# Patient Record
Sex: FEMALE | Race: WHITE | NOT HISPANIC OR LATINO | Employment: FULL TIME | URBAN - METROPOLITAN AREA
[De-identification: names, ages, dates, MRNs, and addresses within clinical notes are randomized per-mention and may not be internally consistent; named-entity substitution may affect disease eponyms.]

---

## 2017-11-14 RX ORDER — ATORVASTATIN CALCIUM 20 MG/1
20 TABLET, FILM COATED ORAL DAILY
COMMUNITY
End: 2021-10-19

## 2017-11-14 RX ORDER — SUMATRIPTAN 100 MG/1
100 TABLET, FILM COATED ORAL ONCE AS NEEDED
COMMUNITY
End: 2021-10-19

## 2017-11-14 RX ORDER — MELOXICAM 15 MG/1
15 TABLET ORAL NIGHTLY
COMMUNITY
End: 2017-12-28 | Stop reason: HOSPADM

## 2017-11-14 RX ORDER — MULTIPLE VITAMINS W/ MINERALS TAB 9MG-400MCG
1 TAB ORAL DAILY
COMMUNITY
End: 2021-10-19

## 2017-11-14 RX ORDER — BACLOFEN 20 MG/1
20 TABLET ORAL 3 TIMES DAILY
COMMUNITY
End: 2023-02-12

## 2017-11-14 RX ORDER — LANOLIN ALCOHOL/MO/W.PET/CERES
1000 CREAM (GRAM) TOPICAL DAILY
COMMUNITY
End: 2021-10-19

## 2017-11-21 ENCOUNTER — HOSPITAL ENCOUNTER (OUTPATIENT)
Dept: GENERAL RADIOLOGY | Facility: HOSPITAL | Age: 52
Discharge: HOME | End: 2017-11-21
Attending: NEUROLOGICAL SURGERY | Admitting: NEUROLOGICAL SURGERY

## 2017-11-21 ENCOUNTER — OFFICE VISIT (OUTPATIENT)
Dept: NEUROSURGERY | Facility: CLINIC | Age: 52
End: 2017-11-21

## 2017-11-21 VITALS
HEIGHT: 67 IN | HEART RATE: 76 BPM | SYSTOLIC BLOOD PRESSURE: 108 MMHG | RESPIRATION RATE: 16 BRPM | WEIGHT: 145 LBS | DIASTOLIC BLOOD PRESSURE: 62 MMHG | BODY MASS INDEX: 22.76 KG/M2

## 2017-11-21 DIAGNOSIS — M54.12 CERVICAL RADICULOPATHY: ICD-10-CM

## 2017-11-21 DIAGNOSIS — M50.20 CERVICAL DISC HERNIATION: ICD-10-CM

## 2017-11-21 DIAGNOSIS — G90.513 COMPLEX REGIONAL PAIN SYNDROME AFFECTING BOTH UPPER ARMS: ICD-10-CM

## 2017-11-21 DIAGNOSIS — M54.12 CERVICAL RADICULOPATHY: Primary | ICD-10-CM

## 2017-11-21 DIAGNOSIS — G43.009 MIGRAINE WITHOUT AURA AND WITHOUT STATUS MIGRAINOSUS, NOT INTRACTABLE: ICD-10-CM

## 2017-11-21 PROCEDURE — 72052 X-RAY EXAM NECK SPINE 6/>VWS: CPT

## 2017-11-21 PROCEDURE — 99244 OFF/OP CNSLTJ NEW/EST MOD 40: CPT | Performed by: NEUROLOGICAL SURGERY

## 2017-11-21 RX ORDER — CLINDAMYCIN PHOSPHATE 900 MG/50ML
900 INJECTION INTRAVENOUS ONCE
Status: CANCELLED | OUTPATIENT
Start: 2017-12-27 | End: 2017-11-21

## 2017-11-21 RX ORDER — ONABOTULINUMTOXINA 100 [USP'U]/1
INJECTION, POWDER, LYOPHILIZED, FOR SOLUTION INTRADERMAL; INTRAMUSCULAR
COMMUNITY
Start: 2017-11-15 | End: 2021-10-19

## 2017-11-21 NOTE — PROGRESS NOTES
Subjective   Patient ID: Reba Banerjee is a 52 y.o. female is being seen for consultation today at the request of Katty Hairston, Amarifor neck, left arm pain, headaches, and back pain. She is accompanied by her .    History of Present Illness     52-year-old right-handed woman who presents on referral for consultation with regard to neck and left arm pain.  She notes that at the beginning of September she awakened with discomfort in the left neck and she thought that she simply had a neck spasm.  This however did not go away.  She has been having discomfort radiating into the lateral aspect of the left hand.  She has a severe sensation that shoots down the left arm.  The pain in the arm and sharp dull and aching.  She notes that the day after awakening that when she went to work the only way she was able to get any relief was to stand with her left arm extended above her head.  This also continues to give her some relief at times.    He is undergone physical therapy and medication management.    Her medical history is complicated by a history of complex regional pain syndrome type II.  This started with a ulnar transposition on the right side and spread to the left side as well and the upper neck.  She is also placed with migraine headaches.  Her complex regional pain syndrome began in 2007.  She has managed reasonably well with initial extensive management with pain management and now simple oral pain medication regimen.    She has noted associated symptoms with imbalance, centration issues, and a central spinal pain that radiates from the neck down to the medial aspect of the shoulder blades on both sides.  She has a sense also of weakness in her upper extremities at times bilaterally.  He has not been able to work since 18 September.  Initially it was thought that this left upper extremity discomfort and neck pain was a manifestation of the complex regional pain syndrome however an MRI scan was  obtained and she is referred for consultation with regard to the disc protrusion that was discovered.    The following portions of the patient's history were reviewed and updated as appropriate: allergies, current medications, past family history, past medical history, past social history, past surgical history and problem list.    Review of Systems   Constitutional: Negative for fever.   HENT: Negative for trouble swallowing.    Eyes: Positive for visual disturbance.   Respiratory: Positive for cough. Negative for wheezing.    Cardiovascular: Negative for chest pain and palpitations.   Gastrointestinal: Positive for abdominal pain.   Genitourinary: Positive for difficulty urinating (decreased frequency). Negative for enuresis.   Musculoskeletal: Positive for back pain, gait problem and neck pain.   Skin: Negative for rash.   Neurological: Positive for weakness and numbness (left arm/hand, wrist).   Psychiatric/Behavioral: Positive for sleep disturbance.       Objective   Physical Exam   Constitutional: She is oriented to person, place, and time.   Eyes: EOM are normal. Pupils are equal, round, and reactive to light.   Neurological: She is oriented to person, place, and time. She has a normal Finger-Nose-Finger Test, a normal Heel to Shin Test, a normal Romberg Test and a normal Tandem Gait Test. Gait normal.   Reflex Scores:       Tricep reflexes are 1+ on the right side and 1+ on the left side.       Bicep reflexes are 1+ on the right side and 0 on the left side.       Brachioradialis reflexes are 0 on the right side and 0 on the left side.  Psychiatric: Her speech is normal.     Neurologic Exam     Mental Status   Oriented to person, place, and time.   Registration: recalls 3 of 3 objects. Recall at 5 minutes: recalls 3 of 3 objects. Follows 3 step commands.   Attention: normal. Concentration: normal.   Speech: speech is normal   Level of consciousness: alert  Knowledge: good.   Able to name object. Able to read.  Able to repeat. Able to write. Normal comprehension.     Cranial Nerves     CN II   Visual fields full to confrontation.     CN III, IV, VI   Pupils are equal, round, and reactive to light.  Extraocular motions are normal.   Right pupil: Consensual response: intact.   Left pupil: Consensual response: intact.   CN III: no CN III palsy  CN VI: no CN VI palsy  Nystagmus: none   Diplopia: none  Ophthalmoparesis: none  Upgaze: normal  Downgaze: normal  Conjugate gaze: present  Vestibulo-ocular reflex: present    CN V   Facial sensation intact.     CN VII   Facial expression full, symmetric.     CN VIII   CN VIII normal.     CN IX, X   CN IX normal.     CN XI   CN XI normal.     CN XII   CN XII normal.     Motor Exam   Muscle bulk: normal  Overall muscle tone: normal  Right arm tone: normal  Left arm tone: normal  Right arm pronator drift: absent  Left arm pronator drift: absent  Right leg tone: normal  Left leg tone: normal    Strength   Right neck flexion: 5/5  Left neck flexion: 5/5  Right neck extension: 5/5  Left neck extension: 5/5  Right deltoid: 5/5  Left deltoid: 4/5  Right biceps: 5/5  Left biceps: 4/5  Right triceps: 5/5  Left triceps: 4/5  Right wrist flexion: 5/5  Left wrist flexion: 4/5  Right wrist extension: 5/5  Left wrist extension: 4/5  Right interossei: 5/5  Left interossei: 5/5  Right abdominals: 5/5  Left abdominals: 5/5  Right iliopsoas: 5/5  Left iliopsoas: 5/5  Right quadriceps: 5/5  Left quadriceps: 5/5  Right hamstrin/5  Left hamstrin/5  Right glutei: 5/5  Left glutei: 5/5  Right anterior tibial: 5/5  Left anterior tibial: 5/5  Right posterior tibial: 5/5  Left posterior tibial: 5/5  Right peroneal: 5/5  Left peroneal: 5/5  Right gastroc: 5/5  Left gastroc: 5/5       Give-away weakness of the left upper extremity.      Sensory Exam   Light touch normal.   Vibration normal.   Proprioception normal.   Pinprick normal.     Gait, Coordination, and Reflexes     Gait  Gait:  normal    Coordination   Romberg: negative  Finger to nose coordination: normal  Heel to shin coordination: normal  Tandem walking coordination: normal    Tremor   Resting tremor: absent  Intention tremor: absent  Action tremor: absent    Reflexes   Right brachioradialis: 0  Left brachioradialis: 0  Right biceps: 1+  Left biceps: 0  Right triceps: 1+  Left triceps: 1+  Right plantar: normal  Left plantar: normal  Right Ferguson: absent  Left Ferguson: absent  Right ankle clonus: absent  Left ankle clonus: absent      Assessment/Plan   Independent Review of Radiographic Studies:    I personally reviewed an MRI of the cervical spine done over 21st 2017 at [facility]: This demonstrates a disc protrusion into the neural foramen on the left at C5 6.  There is a small central disc protrusion at C6 7 perhaps slightly to the right.  I agree with the radiologist's interpretation of moderately severe left C6 7 neural foraminal narrowing.  Medical Decision Making:      The patient presents with the above-noted history and physical findings.  There are no red flags except for the patient's significant pain.  There is giveaway weakness of the left upper extremity which I'm attributing to fear of pain on the left side.    The MRI does demonstrate a disc protrusion and compression of the exiting C6 nerve root which is concordant with the pain syndrome patient had.  The other symptoms that are related are not related directly to the neck.  She has multiple somatic complaints bilateral neck discomfort and headache visual disturbances etc.  I think that this goes along with the overall complex regional pain syndrome type II that she is experiencing for many years.    Realistic expectations of treatment of the disc protrusion to the left C56 would be an improvement in the left arm pain.  I do not think that I would make her vision is better, her headaches improved, the right arm pain get better etc.     Treatment options  include conservative management or surgical treatment.  The patient is not interested in further conservative management because she did not improve very much at all.    I explained that the surgical choices are cervical discectomy with fusion or with a disc replacement.  The use of the disc replacement will be dependent upon the plain x-rays demonstrating minimal or no significant degenerative bony arthritis.  If there is no significant bony arthritis that she is a candidate for a disc replacement rather than a fusion.    The risks, benefits, and alternatives of anterior cervical discectomy with allograft fusion and anterior instrumentation were explained in detail to the patient. The alternative is not to have the operation. The benefit should be, though is not guaranteed, primarily a potential reduction in the neurosensory and/or motor dysfunction; secondarily a possible reduction in neck pain. The risks include, but are not limited to, the possibility of death, infection, bleeding, paralysis, problems with the approach to the cervical spine such as stretching or cutting the laryngeal nerve resulting in vocal chord paralysis, hoarseness, blindness; damage to the carotid artery resulting in stroke; damage to the esophagus resulting in problems with swallowing or painful swallowing; incontinence of urine or stool, sexual dysfunction; meningitis; lack of bony fusion requiring further surgical intervention; osteomyelitis; instrumentation breakdown or pullout; spinal instability; no change or worsening of pain. I also explained the realistic expectations as they pertain to the procedure. The patient voiced understanding of these risks, benefits, alternatives, and realistic expectations and requests that we proceed with operative intervention.    The risks, benefits, and alternatives of anterior cervical disc replacement  were explained in detail to the patient. The alternative is not to have the operation. The benefit  should be, though is not guaranteed, primarily a potential reduction in the neurosensory and/or motor dysfunction; secondarily a possible reduction in neck pain. The risks include, but are not limited to, the possibility of death, infection, bleeding, paralysis, problems with the approach to the cervical spine such as stretching or cutting the laryngeal nerve resulting in vocal chord paralysis, hoarseness, blindness; damage to the carotid artery resulting in stroke; damage to the esophagus resulting in problems with swallowing or painful swallowing; incontinence of urine or stool, sexual dysfunction; meningitis; osteomyelitis; instrumentation breakdown or pullout; spinal instability; no change or worsening of pain; need for further surgery. I also explained the realistic expectations as they pertain to the procedure. The patient voiced understanding of these risks, benefits, alternatives, and realistic expectations and requests that we proceed with operative intervention.    After a complete physical exam, the patient has been informed of the consequences, benefits, appropriate us, and office policies regarding the medication being prescribed. A JIN check will be made on-line, and will be repeated if prescription is renewed after a 90 day period. The patient agrees to adhering to the medication regimen as prescribed.    The patient has been advised that we will manage post-operative pain for 1 month. If further narcotic medication is needed beyond that period, a referral back to the primary care physician or to a pain management specialist will be made. If the patient cancels or fails to show for scheduled follow-up visits or the pain management referral,  further narcotic prescriptions from this practice may cease.    Plan: Anterior cervical discectomy C5 6 with either fusion or disc replacement.    Reba was seen today for neck pain, arm pain and back pain.    Diagnoses and all orders for this  visit:    Cervical radiculopathy  -     XR Spine Cervical Complete With Flex Ext; Future    Cervical disc herniation  -     Case Request; Standing  -     CBC and Differential; Future  -     Basic metabolic panel; Future  -     Sedimentation rate; Future  -     clindamycin (CLEOCIN) 900 mg in dextrose (D5W) 5 % 100 mL IVPB; Infuse 900 mg into a venous catheter 1 (One) Time.  -     Case Request  -     XR Spine Cervical Complete With Flex Ext; Future    Complex regional pain syndrome affecting both upper arms    Migraine without aura and without status migrainosus, not intractable    Other orders  -     Follow Anesthesia Guidelines / Standing Orders; Future  -     Obtain informed consent  -     Follow Anesthesia Guidelines / Standing Orders; Standing  -     ELEAZAR hose- To be placed on patient in pre-op; Standing  -     SCD (sequential compression device)- to be placed on patient in Pre-op; Standing  -     Clorhexidine skin prep  -     Provide instructions to patient on NPO status      Return for Follow up with nurse practitioner, Recheck 2 weeks after surgery.

## 2017-11-26 ENCOUNTER — RESULTS ENCOUNTER (OUTPATIENT)
Dept: NEUROSURGERY | Facility: CLINIC | Age: 52
End: 2017-11-26

## 2017-11-26 DIAGNOSIS — M50.20 CERVICAL DISC HERNIATION: ICD-10-CM

## 2017-12-15 ENCOUNTER — OFFICE VISIT (OUTPATIENT)
Dept: NEUROSURGERY | Facility: CLINIC | Age: 52
End: 2017-12-15

## 2017-12-15 ENCOUNTER — APPOINTMENT (OUTPATIENT)
Dept: PREADMISSION TESTING | Facility: HOSPITAL | Age: 52
End: 2017-12-15

## 2017-12-15 VITALS
DIASTOLIC BLOOD PRESSURE: 62 MMHG | RESPIRATION RATE: 16 BRPM | HEIGHT: 67 IN | BODY MASS INDEX: 22.76 KG/M2 | SYSTOLIC BLOOD PRESSURE: 112 MMHG | HEART RATE: 72 BPM | WEIGHT: 145 LBS

## 2017-12-15 VITALS
RESPIRATION RATE: 20 BRPM | OXYGEN SATURATION: 100 % | DIASTOLIC BLOOD PRESSURE: 66 MMHG | HEIGHT: 67 IN | TEMPERATURE: 98.2 F | HEART RATE: 69 BPM | WEIGHT: 146.4 LBS | SYSTOLIC BLOOD PRESSURE: 96 MMHG | BODY MASS INDEX: 22.98 KG/M2

## 2017-12-15 DIAGNOSIS — M54.12 CERVICAL RADICULOPATHY: Primary | ICD-10-CM

## 2017-12-15 DIAGNOSIS — M50.20 CERVICAL DISC HERNIATION: ICD-10-CM

## 2017-12-15 LAB
ANION GAP SERPL CALCULATED.3IONS-SCNC: 11.3 MMOL/L
BASOPHILS # BLD AUTO: 0.04 10*3/MM3 (ref 0–0.2)
BASOPHILS NFR BLD AUTO: 0.6 % (ref 0–1.5)
BUN BLD-MCNC: 14 MG/DL (ref 6–20)
BUN/CREAT SERPL: 14.9 (ref 7–25)
CALCIUM SPEC-SCNC: 8.3 MG/DL (ref 8.6–10.5)
CHLORIDE SERPL-SCNC: 108 MMOL/L (ref 98–107)
CO2 SERPL-SCNC: 25.7 MMOL/L (ref 22–29)
CREAT BLD-MCNC: 0.94 MG/DL (ref 0.57–1)
DEPRECATED RDW RBC AUTO: 47.8 FL (ref 37–54)
EOSINOPHIL # BLD AUTO: 0.24 10*3/MM3 (ref 0–0.7)
EOSINOPHIL NFR BLD AUTO: 3.8 % (ref 0.3–6.2)
ERYTHROCYTE [DISTWIDTH] IN BLOOD BY AUTOMATED COUNT: 13.2 % (ref 11.7–13)
ERYTHROCYTE [SEDIMENTATION RATE] IN BLOOD: 8 MM/HR (ref 0–30)
GFR SERPL CREATININE-BSD FRML MDRD: 63 ML/MIN/1.73
GLUCOSE BLD-MCNC: 62 MG/DL (ref 65–99)
HCT VFR BLD AUTO: 43.8 % (ref 35.6–45.5)
HGB BLD-MCNC: 13.3 G/DL (ref 11.9–15.5)
IMM GRANULOCYTES # BLD: 0.02 10*3/MM3 (ref 0–0.03)
IMM GRANULOCYTES NFR BLD: 0.3 % (ref 0–0.5)
LYMPHOCYTES # BLD AUTO: 2.97 10*3/MM3 (ref 0.9–4.8)
LYMPHOCYTES NFR BLD AUTO: 46.6 % (ref 19.6–45.3)
MCH RBC QN AUTO: 30.1 PG (ref 26.9–32)
MCHC RBC AUTO-ENTMCNC: 30.4 G/DL (ref 32.4–36.3)
MCV RBC AUTO: 99.1 FL (ref 80.5–98.2)
MONOCYTES # BLD AUTO: 0.33 10*3/MM3 (ref 0.2–1.2)
MONOCYTES NFR BLD AUTO: 5.2 % (ref 5–12)
NEUTROPHILS # BLD AUTO: 2.78 10*3/MM3 (ref 1.9–8.1)
NEUTROPHILS NFR BLD AUTO: 43.5 % (ref 42.7–76)
PLATELET # BLD AUTO: 234 10*3/MM3 (ref 140–500)
PMV BLD AUTO: 11 FL (ref 6–12)
POTASSIUM BLD-SCNC: 3.5 MMOL/L (ref 3.5–5.2)
RBC # BLD AUTO: 4.42 10*6/MM3 (ref 3.9–5.2)
SODIUM BLD-SCNC: 145 MMOL/L (ref 136–145)
WBC NRBC COR # BLD: 6.38 10*3/MM3 (ref 4.5–10.7)

## 2017-12-15 PROCEDURE — 80048 BASIC METABOLIC PNL TOTAL CA: CPT | Performed by: NEUROLOGICAL SURGERY

## 2017-12-15 PROCEDURE — 36415 COLL VENOUS BLD VENIPUNCTURE: CPT | Performed by: NEUROLOGICAL SURGERY

## 2017-12-15 PROCEDURE — 85025 COMPLETE CBC W/AUTO DIFF WBC: CPT | Performed by: NEUROLOGICAL SURGERY

## 2017-12-15 PROCEDURE — 85652 RBC SED RATE AUTOMATED: CPT | Performed by: NEUROLOGICAL SURGERY

## 2017-12-15 PROCEDURE — 99213 OFFICE O/P EST LOW 20 MIN: CPT | Performed by: NURSE PRACTITIONER

## 2017-12-15 RX ORDER — TOPIRAMATE 50 MG/1
100 TABLET, FILM COATED ORAL NIGHTLY
COMMUNITY
End: 2018-01-10 | Stop reason: SDUPTHER

## 2017-12-15 NOTE — DISCHARGE INSTRUCTIONS
Take the following medications the morning of surgery with a small sip of water: ESOMEPRAZOLE.  STOP PREOPERATIVELY ANY ANTIINFLAMMATORY, HERBAL, COQ10, MELOXICAM, MULTIVITAMIN.  ARRIVE TO THE MAIN SURGERY DESK THE DAY OF YOUR SURGERY BY 8AM.        General Instructions:  • Do not eat or drink anything after midnight the night before surgery.  • Infants may have breast milk up to four hours before surgery.  • Infants drinking formula may drink formula up to six hours before surgery.   • Patients who avoid smoking, chewing tobacco and alcohol for 4 weeks prior to surgery have a reduced risk of post-operative complications.  Quit smoking as many days before surgery as you can.  • Do not smoke, use chewing tobacco or drink alcohol the day of surgery.   • If applicable bring your C-PAP/ BI-PAP machine.  • Bring any papers given to you in the doctor’s office.  • Wear clean comfortable clothes and socks.  • Do not wear contact lenses or make-up.  Bring a case for your glasses.   • Bring crutches or walker if applicable.  • Remove all piercings.  Leave jewelry and any other valuables at home.  • The Pre-Admission Testing nurse will instruct you to bring medications if unable to obtain an accurate list in Pre-Admission Testing.        If you were given a blood bank ID arm band remember to bring it with you the day of surgery.    Preventing a Surgical Site Infection:  • For 2 to 3 days before surgery, avoid shaving with a razor because the razor can irritate skin and make it easier to develop an infection.  • The night prior to surgery sleep in a clean bed with clean clothing.  Do not allow pets to sleep with you.  • Shower on the morning of surgery using a fresh bar of anti-bacterial soap (such as Dial) and clean washcloth.  Dry with a clean towel and dress in clean clothing.  • Ask your surgeon if you will be receiving antibiotics prior to surgery.  • Make sure you, your family, and all healthcare providers clean their  hands with soap and water or an alcohol based hand  before caring for you or your wound.    Day of surgery:  Upon arrival, a Pre-op nurse and Anesthesiologist will review your health history, obtain vital signs, and answer questions you may have.  The only belongings needed at this time will be your home medications and if applicable your C-PAP/BI-PAP machine.  If you are staying overnight your family can leave the rest of your belongings in the car and bring them to your room later.  A Pre-op nurse will start an IV and you may receive medication in preparation for surgery, including something to help you relax.  Your family will be able to see you in the Pre-op area.  While you are in surgery your family should notify the waiting room  if they leave the waiting room area and provide a contact phone number.    Please be aware that surgery does come with discomfort.  We want to make every effort to control your discomfort so please discuss any uncontrolled symptoms with your nurse.   Your doctor will most likely have prescribed pain medications.      If you are going home after surgery you will receive individualized written care instructions before being discharged.  A responsible adult must drive you to and from the hospital on the day of your surgery and stay with you for 24 hours.    If you are staying overnight following surgery, you will be transported to your hospital room following the recovery period.  [facility] has all private rooms.    If you have any questions please call Pre-Admission Testing at [phone].  Deductibles and co-payments are collected on the day of service. Please be prepared to pay the required co-pay, deductible or deposit on the day of service as defined by your plan.

## 2017-12-15 NOTE — PROGRESS NOTES
"Subjective   Patient ID: Reba Banerjee is a 52 y.o. female is here today for follow-up of neck and left arm pain prior to scheduled C5/6 surgery. She is accompanied by her , Ismael.    History of Present Illness     She presents the office today for updated history and physical and preoperative evaluation before undergoing scheduled C5-6 fusion or disc replacement with Dr. Holloway scheduled for December 27, 2017.  She underwent cervical flexion and extension x-rays following her last office visit.    She was last here 1 month ago and reported neck left arm and back pain, as well as headaches.  MRI cervical spine revealed disc protrusion to the left at C5-6 and she was scheduled for surgery.  She completed preoperative testing for today's office visit.    She denies any changes with regard to her symptoms.  She continues with constant left arm pain with numbness and tingling in her fingers.  She also has a burning sensation in the posterior neck.  She is hopeful for complete symptom resolution following surgery.  She denies any recent fever or chills.  She is ready to proceed forward with surgery.    She presents with her .        /62 (BP Location: Left arm, Patient Position: Sitting, Cuff Size: Adult)  Pulse 72  Resp 16  Ht 170.2 cm (67\")  Wt 65.8 kg (145 lb)  BMI 22.71 kg/m2      The following portions of the patient's history were reviewed and updated as appropriate: allergies, current medications, past family history, past medical history, past social history, past surgical history and problem list.    Review of Systems   Constitutional: Negative for fever.   HENT: Negative for sore throat.    Respiratory: Negative for cough and wheezing.    Musculoskeletal: Positive for neck pain (into left arm).   Neurological: Positive for weakness (left /arm strength weakness) and numbness (left hand, somewhat in right hand).       Objective   Physical Exam   Constitutional: She is oriented to " person, place, and time. Vital signs are normal. She appears well-developed and well-nourished. She is cooperative.   HENT:   Head: Normocephalic and atraumatic.   Eyes: EOM are normal. Pupils are equal, round, and reactive to light. No scleral icterus.   Neck: Normal range of motion. Neck supple. Carotid bruit is not present.   Cardiovascular: Normal rate, regular rhythm and intact distal pulses.    No murmur heard.  Pulmonary/Chest: Effort normal and breath sounds normal.   Abdominal: Soft. Bowel sounds are normal. There is no tenderness.   Musculoskeletal: Normal range of motion.   Neurological: She is alert and oriented to person, place, and time. She has normal strength. She displays no atrophy. No cranial nerve deficit or sensory deficit. She exhibits normal muscle tone. She displays a negative Romberg sign. Coordination and gait normal. GCS eye subscore is 4. GCS verbal subscore is 5. GCS motor subscore is 6.   Reflex Scores:       Tricep reflexes are 2+ on the right side and 2+ on the left side.       Bicep reflexes are 2+ on the right side and 2+ on the left side.       Brachioradialis reflexes are 2+ on the right side and 2+ on the left side.       Patellar reflexes are 2+ on the right side and 2+ on the left side.       Achilles reflexes are 2+ on the right side and 2+ on the left side.  Able to tandem walk  Able to walk on heels and toes   Skin: Skin is warm, dry and intact.   Psychiatric: She has a normal mood and affect. Her speech is normal and behavior is normal. Judgment and thought content normal. Cognition and memory are normal.   Vitals reviewed.    Neurologic Exam     Mental Status   Oriented to person, place, and time.   Speech: speech is normal     Cranial Nerves     CN III, IV, VI   Pupils are equal, round, and reactive to light.  Extraocular motions are normal.     Motor Exam     Strength   Strength 5/5 throughout.     Gait, Coordination, and Reflexes     Reflexes   Right brachioradialis:  2+  Left brachioradialis: 2+  Right biceps: 2+  Left biceps: 2+  Right triceps: 2+  Left triceps: 2+  Right patellar: 2+  Left patellar: 2+  Right achilles: 2+  Left achilles: 2+      Assessment/Plan   Independent Review of Radiographic Studies:    Cervical flexion and extension x-rays dated November 21 revealed no malalignment or instability with flexion or extension, noted to have foraminal narrowing at C5 6.    Medical Decision Making:    She returns to the office today for preoperative evaluation and for updated history and physical before undergoing scheduled cervical decompression with possible disc replacement versus fusion with Dr. Holloway on December 27, 2017.  Exam as noted above, no red flags.  After full clinical evaluation she is okay to proceed forward as scheduled.  Symptoms have remained unchanged.  Risks and benefits were explained at length at her last office visit with Dr. Holloway.  Preoperative testing was completed before her office visit and lab work appears stable.    Plan: Surgery as scheduled, return to office postop as arranged  Reba was seen today for pre-op exam.    Diagnoses and all orders for this visit:    Cervical radiculopathy    Cervical disc herniation      Return for Follow up as scheduled.

## 2017-12-20 DIAGNOSIS — Z98.890 POST-OPERATIVE STATE: ICD-10-CM

## 2017-12-20 DIAGNOSIS — M50.20 DISPLACEMENT OF CERVICAL INTERVERTEBRAL DISC WITHOUT MYELOPATHY: Primary | ICD-10-CM

## 2017-12-27 ENCOUNTER — ANESTHESIA EVENT (OUTPATIENT)
Dept: PERIOP | Facility: HOSPITAL | Age: 52
End: 2017-12-27

## 2017-12-27 ENCOUNTER — HOSPITAL ENCOUNTER (INPATIENT)
Facility: HOSPITAL | Age: 52
LOS: 1 days | Discharge: HOME | End: 2017-12-28
Attending: NEUROLOGICAL SURGERY | Admitting: NEUROLOGICAL SURGERY

## 2017-12-27 ENCOUNTER — ANESTHESIA (OUTPATIENT)
Dept: PERIOP | Facility: HOSPITAL | Age: 52
End: 2017-12-27

## 2017-12-27 ENCOUNTER — TELEPHONE (OUTPATIENT)
Dept: NEUROSURGERY | Facility: CLINIC | Age: 52
End: 2017-12-27

## 2017-12-27 ENCOUNTER — APPOINTMENT (OUTPATIENT)
Dept: GENERAL RADIOLOGY | Facility: HOSPITAL | Age: 52
End: 2017-12-27

## 2017-12-27 DIAGNOSIS — R26.89 DECREASED MOBILITY: Primary | ICD-10-CM

## 2017-12-27 DIAGNOSIS — M50.20 CERVICAL DISC HERNIATION: ICD-10-CM

## 2017-12-27 PROCEDURE — 25010000002 PROPOFOL 10 MG/ML EMULSION: Performed by: NURSE ANESTHETIST, CERTIFIED REGISTERED

## 2017-12-27 PROCEDURE — 25010000002 DEXAMETHASONE PER 1 MG: Performed by: NURSE ANESTHETIST, CERTIFIED REGISTERED

## 2017-12-27 PROCEDURE — 25010000002 VANCOMYCIN PER 500 MG: Performed by: NEUROLOGICAL SURGERY

## 2017-12-27 PROCEDURE — 25010000002 FENTANYL CITRATE (PF) 100 MCG/2ML SOLUTION: Performed by: NURSE ANESTHETIST, CERTIFIED REGISTERED

## 2017-12-27 PROCEDURE — 25010000002 HYDROMORPHONE PER 4 MG: Performed by: NURSE ANESTHETIST, CERTIFIED REGISTERED

## 2017-12-27 PROCEDURE — 25810000003 SODIUM CHLORIDE 0.9 % WITH KCL 20 MEQ 20-0.9 MEQ/L-% SOLUTION: Performed by: NEUROLOGICAL SURGERY

## 2017-12-27 PROCEDURE — 0RR30JZ REPLACEMENT OF CERVICAL VERTEBRAL DISC WITH SYNTHETIC SUBSTITUTE, OPEN APPROACH: ICD-10-PCS | Performed by: NEUROLOGICAL SURGERY

## 2017-12-27 PROCEDURE — 76000 FLUOROSCOPY <1 HR PHYS/QHP: CPT

## 2017-12-27 PROCEDURE — 25010000002 MIDAZOLAM PER 1 MG: Performed by: ANESTHESIOLOGY

## 2017-12-27 PROCEDURE — 25010000002 ONDANSETRON PER 1 MG: Performed by: NURSE ANESTHETIST, CERTIFIED REGISTERED

## 2017-12-27 PROCEDURE — 22856 TOT DISC ARTHRP 1NTRSPC CRV: CPT | Performed by: SPECIALIST/TECHNOLOGIST, OTHER

## 2017-12-27 PROCEDURE — 0RB30ZZ EXCISION OF CERVICAL VERTEBRAL DISC, OPEN APPROACH: ICD-10-PCS | Performed by: NEUROLOGICAL SURGERY

## 2017-12-27 PROCEDURE — 72040 X-RAY EXAM NECK SPINE 2-3 VW: CPT

## 2017-12-27 PROCEDURE — 25010000002 MORPHINE PER 10 MG: Performed by: NEUROLOGICAL SURGERY

## 2017-12-27 PROCEDURE — 94799 UNLISTED PULMONARY SVC/PX: CPT

## 2017-12-27 PROCEDURE — C1713 ANCHOR/SCREW BN/BN,TIS/BN: HCPCS | Performed by: NEUROLOGICAL SURGERY

## 2017-12-27 PROCEDURE — 22856 TOT DISC ARTHRP 1NTRSPC CRV: CPT | Performed by: NEUROLOGICAL SURGERY

## 2017-12-27 DEVICE — PRESTIGE LP DISC 6X14MM
Type: IMPLANTABLE DEVICE | Site: SPINE CERVICAL | Status: FUNCTIONAL
Brand: PRESTIGE® LP CERVICAL DISC SYSTEM

## 2017-12-27 RX ORDER — SODIUM CHLORIDE, SODIUM LACTATE, POTASSIUM CHLORIDE, CALCIUM CHLORIDE 600; 310; 30; 20 MG/100ML; MG/100ML; MG/100ML; MG/100ML
100 INJECTION, SOLUTION INTRAVENOUS CONTINUOUS
Status: DISCONTINUED | OUTPATIENT
Start: 2017-12-27 | End: 2017-12-27

## 2017-12-27 RX ORDER — TOPIRAMATE 50 MG/1
50 TABLET, FILM COATED ORAL EVERY MORNING
Status: DISCONTINUED | OUTPATIENT
Start: 2017-12-28 | End: 2017-12-28 | Stop reason: HOSPADM

## 2017-12-27 RX ORDER — NALOXONE HCL 0.4 MG/ML
0.2 VIAL (ML) INJECTION AS NEEDED
Status: DISCONTINUED | OUTPATIENT
Start: 2017-12-27 | End: 2017-12-27 | Stop reason: HOSPADM

## 2017-12-27 RX ORDER — ACETAMINOPHEN 325 MG/1
650 TABLET ORAL EVERY 4 HOURS PRN
Status: DISCONTINUED | OUTPATIENT
Start: 2017-12-27 | End: 2017-12-28

## 2017-12-27 RX ORDER — PROMETHAZINE HYDROCHLORIDE 25 MG/1
25 TABLET ORAL ONCE AS NEEDED
Status: DISCONTINUED | OUTPATIENT
Start: 2017-12-27 | End: 2017-12-27 | Stop reason: HOSPADM

## 2017-12-27 RX ORDER — MIDAZOLAM HYDROCHLORIDE 1 MG/ML
1 INJECTION INTRAMUSCULAR; INTRAVENOUS
Status: DISCONTINUED | OUTPATIENT
Start: 2017-12-27 | End: 2017-12-27 | Stop reason: HOSPADM

## 2017-12-27 RX ORDER — ONDANSETRON 4 MG/1
4 TABLET, FILM COATED ORAL EVERY 6 HOURS PRN
Status: DISCONTINUED | OUTPATIENT
Start: 2017-12-27 | End: 2017-12-28 | Stop reason: HOSPADM

## 2017-12-27 RX ORDER — FENTANYL CITRATE 50 UG/ML
INJECTION, SOLUTION INTRAMUSCULAR; INTRAVENOUS AS NEEDED
Status: DISCONTINUED | OUTPATIENT
Start: 2017-12-27 | End: 2017-12-27 | Stop reason: SURG

## 2017-12-27 RX ORDER — FAMOTIDINE 10 MG/ML
20 INJECTION, SOLUTION INTRAVENOUS EVERY 12 HOURS SCHEDULED
Status: DISCONTINUED | OUTPATIENT
Start: 2017-12-27 | End: 2017-12-28 | Stop reason: HOSPADM

## 2017-12-27 RX ORDER — ATENOLOL 25 MG/1
50 TABLET ORAL NIGHTLY
Status: DISCONTINUED | OUTPATIENT
Start: 2017-12-27 | End: 2017-12-28 | Stop reason: HOSPADM

## 2017-12-27 RX ORDER — GABAPENTIN 300 MG/1
600 CAPSULE ORAL EVERY 8 HOURS SCHEDULED
Status: DISCONTINUED | OUTPATIENT
Start: 2017-12-27 | End: 2017-12-28 | Stop reason: HOSPADM

## 2017-12-27 RX ORDER — EPHEDRINE SULFATE 50 MG/ML
5 INJECTION, SOLUTION INTRAVENOUS ONCE AS NEEDED
Status: DISCONTINUED | OUTPATIENT
Start: 2017-12-27 | End: 2017-12-27 | Stop reason: HOSPADM

## 2017-12-27 RX ORDER — PROMETHAZINE HYDROCHLORIDE 25 MG/1
25 SUPPOSITORY RECTAL ONCE AS NEEDED
Status: DISCONTINUED | OUTPATIENT
Start: 2017-12-27 | End: 2017-12-27 | Stop reason: HOSPADM

## 2017-12-27 RX ORDER — IPRATROPIUM BROMIDE AND ALBUTEROL SULFATE 2.5; .5 MG/3ML; MG/3ML
3 SOLUTION RESPIRATORY (INHALATION) ONCE AS NEEDED
Status: DISCONTINUED | OUTPATIENT
Start: 2017-12-27 | End: 2017-12-27 | Stop reason: HOSPADM

## 2017-12-27 RX ORDER — HYDRALAZINE HYDROCHLORIDE 20 MG/ML
5 INJECTION INTRAMUSCULAR; INTRAVENOUS
Status: DISCONTINUED | OUTPATIENT
Start: 2017-12-27 | End: 2017-12-27 | Stop reason: HOSPADM

## 2017-12-27 RX ORDER — FLUMAZENIL 0.1 MG/ML
0.2 INJECTION INTRAVENOUS AS NEEDED
Status: DISCONTINUED | OUTPATIENT
Start: 2017-12-27 | End: 2017-12-27 | Stop reason: HOSPADM

## 2017-12-27 RX ORDER — MIDAZOLAM HYDROCHLORIDE 1 MG/ML
2 INJECTION INTRAMUSCULAR; INTRAVENOUS
Status: DISCONTINUED | OUTPATIENT
Start: 2017-12-27 | End: 2017-12-27 | Stop reason: HOSPADM

## 2017-12-27 RX ORDER — PANTOPRAZOLE SODIUM 40 MG/1
40 TABLET, DELAYED RELEASE ORAL EVERY MORNING
Status: DISCONTINUED | OUTPATIENT
Start: 2017-12-28 | End: 2017-12-28 | Stop reason: HOSPADM

## 2017-12-27 RX ORDER — DOCUSATE SODIUM 100 MG/1
100 CAPSULE, LIQUID FILLED ORAL 2 TIMES DAILY PRN
Status: DISCONTINUED | OUTPATIENT
Start: 2017-12-27 | End: 2017-12-28 | Stop reason: HOSPADM

## 2017-12-27 RX ORDER — ONDANSETRON 2 MG/ML
4 INJECTION INTRAMUSCULAR; INTRAVENOUS EVERY 6 HOURS PRN
Status: DISCONTINUED | OUTPATIENT
Start: 2017-12-27 | End: 2017-12-28 | Stop reason: HOSPADM

## 2017-12-27 RX ORDER — SUMATRIPTAN 100 MG/1
100 TABLET, FILM COATED ORAL ONCE AS NEEDED
Status: DISCONTINUED | OUTPATIENT
Start: 2017-12-27 | End: 2017-12-28 | Stop reason: HOSPADM

## 2017-12-27 RX ORDER — SODIUM CHLORIDE AND POTASSIUM CHLORIDE 150; 900 MG/100ML; MG/100ML
60 INJECTION, SOLUTION INTRAVENOUS CONTINUOUS
Status: DISCONTINUED | OUTPATIENT
Start: 2017-12-27 | End: 2017-12-28 | Stop reason: HOSPADM

## 2017-12-27 RX ORDER — HYDROMORPHONE HYDROCHLORIDE 1 MG/ML
0.5 INJECTION, SOLUTION INTRAMUSCULAR; INTRAVENOUS; SUBCUTANEOUS
Status: DISCONTINUED | OUTPATIENT
Start: 2017-12-27 | End: 2017-12-27 | Stop reason: HOSPADM

## 2017-12-27 RX ORDER — HYDROCODONE BITARTRATE AND ACETAMINOPHEN 5; 325 MG/1; MG/1
1 TABLET ORAL ONCE AS NEEDED
Status: DISCONTINUED | OUTPATIENT
Start: 2017-12-27 | End: 2017-12-27 | Stop reason: HOSPADM

## 2017-12-27 RX ORDER — FENTANYL CITRATE 50 UG/ML
50 INJECTION, SOLUTION INTRAMUSCULAR; INTRAVENOUS
Status: DISCONTINUED | OUTPATIENT
Start: 2017-12-27 | End: 2017-12-27 | Stop reason: HOSPADM

## 2017-12-27 RX ORDER — CHOLECALCIFEROL (VITAMIN D3) 125 MCG
1000 CAPSULE ORAL DAILY
Status: DISCONTINUED | OUTPATIENT
Start: 2017-12-27 | End: 2017-12-28 | Stop reason: HOSPADM

## 2017-12-27 RX ORDER — ONDANSETRON 2 MG/ML
INJECTION INTRAMUSCULAR; INTRAVENOUS AS NEEDED
Status: DISCONTINUED | OUTPATIENT
Start: 2017-12-27 | End: 2017-12-27 | Stop reason: SURG

## 2017-12-27 RX ORDER — SODIUM CHLORIDE 9 MG/ML
INJECTION, SOLUTION INTRAVENOUS AS NEEDED
Status: DISCONTINUED | OUTPATIENT
Start: 2017-12-27 | End: 2017-12-27 | Stop reason: HOSPADM

## 2017-12-27 RX ORDER — DEXAMETHASONE SODIUM PHOSPHATE 10 MG/ML
INJECTION INTRAMUSCULAR; INTRAVENOUS AS NEEDED
Status: DISCONTINUED | OUTPATIENT
Start: 2017-12-27 | End: 2017-12-27 | Stop reason: SURG

## 2017-12-27 RX ORDER — IBUPROFEN 800 MG/1
800 TABLET ORAL EVERY 8 HOURS SCHEDULED
Status: DISCONTINUED | OUTPATIENT
Start: 2017-12-27 | End: 2017-12-28 | Stop reason: HOSPADM

## 2017-12-27 RX ORDER — BACLOFEN 10 MG/1
20 TABLET ORAL 3 TIMES DAILY
Status: DISCONTINUED | OUTPATIENT
Start: 2017-12-27 | End: 2017-12-28 | Stop reason: HOSPADM

## 2017-12-27 RX ORDER — ROCURONIUM BROMIDE 10 MG/ML
INJECTION, SOLUTION INTRAVENOUS AS NEEDED
Status: DISCONTINUED | OUTPATIENT
Start: 2017-12-27 | End: 2017-12-27 | Stop reason: SURG

## 2017-12-27 RX ORDER — SODIUM CHLORIDE 0.9 % (FLUSH) 0.9 %
1-10 SYRINGE (ML) INJECTION AS NEEDED
Status: DISCONTINUED | OUTPATIENT
Start: 2017-12-27 | End: 2017-12-27 | Stop reason: HOSPADM

## 2017-12-27 RX ORDER — TOPIRAMATE 100 MG/1
100 TABLET, FILM COATED ORAL NIGHTLY
Status: DISCONTINUED | OUTPATIENT
Start: 2017-12-27 | End: 2017-12-28 | Stop reason: HOSPADM

## 2017-12-27 RX ORDER — FAMOTIDINE 20 MG/1
20 TABLET, FILM COATED ORAL EVERY 12 HOURS SCHEDULED
Status: DISCONTINUED | OUTPATIENT
Start: 2017-12-27 | End: 2017-12-28 | Stop reason: HOSPADM

## 2017-12-27 RX ORDER — CLINDAMYCIN PHOSPHATE 900 MG/50ML
900 INJECTION INTRAVENOUS ONCE
Status: COMPLETED | OUTPATIENT
Start: 2017-12-27 | End: 2017-12-27

## 2017-12-27 RX ORDER — MORPHINE SULFATE 2 MG/ML
1 INJECTION, SOLUTION INTRAMUSCULAR; INTRAVENOUS EVERY 4 HOURS PRN
Status: DISCONTINUED | OUTPATIENT
Start: 2017-12-27 | End: 2017-12-28

## 2017-12-27 RX ORDER — NALOXONE HCL 0.4 MG/ML
0.4 VIAL (ML) INJECTION
Status: DISCONTINUED | OUTPATIENT
Start: 2017-12-27 | End: 2017-12-28

## 2017-12-27 RX ORDER — EPHEDRINE SULFATE 50 MG/ML
INJECTION, SOLUTION INTRAVENOUS AS NEEDED
Status: DISCONTINUED | OUTPATIENT
Start: 2017-12-27 | End: 2017-12-27 | Stop reason: SURG

## 2017-12-27 RX ORDER — DIPHENHYDRAMINE HYDROCHLORIDE 50 MG/ML
12.5 INJECTION INTRAMUSCULAR; INTRAVENOUS
Status: DISCONTINUED | OUTPATIENT
Start: 2017-12-27 | End: 2017-12-27 | Stop reason: HOSPADM

## 2017-12-27 RX ORDER — LABETALOL HYDROCHLORIDE 5 MG/ML
5 INJECTION, SOLUTION INTRAVENOUS
Status: DISCONTINUED | OUTPATIENT
Start: 2017-12-27 | End: 2017-12-27 | Stop reason: HOSPADM

## 2017-12-27 RX ORDER — ONDANSETRON 2 MG/ML
4 INJECTION INTRAMUSCULAR; INTRAVENOUS ONCE AS NEEDED
Status: DISCONTINUED | OUTPATIENT
Start: 2017-12-27 | End: 2017-12-27 | Stop reason: HOSPADM

## 2017-12-27 RX ORDER — PROMETHAZINE HYDROCHLORIDE 25 MG/ML
12.5 INJECTION, SOLUTION INTRAMUSCULAR; INTRAVENOUS ONCE AS NEEDED
Status: DISCONTINUED | OUTPATIENT
Start: 2017-12-27 | End: 2017-12-27 | Stop reason: HOSPADM

## 2017-12-27 RX ORDER — MULTIPLE VITAMINS W/ MINERALS TAB 9MG-400MCG
1 TAB ORAL DAILY
Status: DISCONTINUED | OUTPATIENT
Start: 2017-12-27 | End: 2017-12-28 | Stop reason: HOSPADM

## 2017-12-27 RX ORDER — ATORVASTATIN CALCIUM 20 MG/1
20 TABLET, FILM COATED ORAL DAILY
Status: DISCONTINUED | OUTPATIENT
Start: 2017-12-27 | End: 2017-12-28 | Stop reason: HOSPADM

## 2017-12-27 RX ORDER — ONDANSETRON 4 MG/1
4 TABLET, ORALLY DISINTEGRATING ORAL EVERY 6 HOURS PRN
Status: DISCONTINUED | OUTPATIENT
Start: 2017-12-27 | End: 2017-12-28 | Stop reason: HOSPADM

## 2017-12-27 RX ORDER — SODIUM CHLORIDE, SODIUM LACTATE, POTASSIUM CHLORIDE, CALCIUM CHLORIDE 600; 310; 30; 20 MG/100ML; MG/100ML; MG/100ML; MG/100ML
9 INJECTION, SOLUTION INTRAVENOUS CONTINUOUS
Status: DISCONTINUED | OUTPATIENT
Start: 2017-12-27 | End: 2017-12-27

## 2017-12-27 RX ORDER — FAMOTIDINE 10 MG/ML
20 INJECTION, SOLUTION INTRAVENOUS ONCE
Status: COMPLETED | OUTPATIENT
Start: 2017-12-27 | End: 2017-12-27

## 2017-12-27 RX ORDER — SODIUM CHLORIDE 0.9 % (FLUSH) 0.9 %
1-10 SYRINGE (ML) INJECTION AS NEEDED
Status: DISCONTINUED | OUTPATIENT
Start: 2017-12-27 | End: 2017-12-28 | Stop reason: HOSPADM

## 2017-12-27 RX ORDER — PROPOFOL 10 MG/ML
VIAL (ML) INTRAVENOUS AS NEEDED
Status: DISCONTINUED | OUTPATIENT
Start: 2017-12-27 | End: 2017-12-27 | Stop reason: SURG

## 2017-12-27 RX ADMIN — BACLOFEN 20 MG: 10 TABLET ORAL at 20:50

## 2017-12-27 RX ADMIN — PROPOFOL 150 MG: 10 INJECTION, EMULSION INTRAVENOUS at 09:58

## 2017-12-27 RX ADMIN — SODIUM CHLORIDE, POTASSIUM CHLORIDE, SODIUM LACTATE AND CALCIUM CHLORIDE: 600; 310; 30; 20 INJECTION, SOLUTION INTRAVENOUS at 10:08

## 2017-12-27 RX ADMIN — FENTANYL CITRATE 100 MCG: 50 INJECTION INTRAMUSCULAR; INTRAVENOUS at 09:58

## 2017-12-27 RX ADMIN — HYDROMORPHONE HYDROCHLORIDE 0.5 MG: 10 INJECTION INTRAMUSCULAR; INTRAVENOUS; SUBCUTANEOUS at 12:30

## 2017-12-27 RX ADMIN — SODIUM CHLORIDE, POTASSIUM CHLORIDE, SODIUM LACTATE AND CALCIUM CHLORIDE 9 ML/HR: 600; 310; 30; 20 INJECTION, SOLUTION INTRAVENOUS at 09:38

## 2017-12-27 RX ADMIN — BACLOFEN 20 MG: 10 TABLET ORAL at 15:19

## 2017-12-27 RX ADMIN — SUGAMMADEX 250 MG: 100 INJECTION, SOLUTION INTRAVENOUS at 11:30

## 2017-12-27 RX ADMIN — ATORVASTATIN CALCIUM 20 MG: 20 TABLET, FILM COATED ORAL at 15:18

## 2017-12-27 RX ADMIN — AMITRIPTYLINE HYDROCHLORIDE 75 MG: 50 TABLET, FILM COATED ORAL at 20:50

## 2017-12-27 RX ADMIN — FAMOTIDINE 20 MG: 10 INJECTION, SOLUTION INTRAVENOUS at 09:39

## 2017-12-27 RX ADMIN — HYDROMORPHONE HYDROCHLORIDE 0.5 MG: 10 INJECTION INTRAMUSCULAR; INTRAVENOUS; SUBCUTANEOUS at 12:36

## 2017-12-27 RX ADMIN — GABAPENTIN 600 MG: 300 CAPSULE ORAL at 15:19

## 2017-12-27 RX ADMIN — GABAPENTIN 600 MG: 300 CAPSULE ORAL at 22:17

## 2017-12-27 RX ADMIN — ROCURONIUM BROMIDE 30 MG: 10 INJECTION INTRAVENOUS at 09:58

## 2017-12-27 RX ADMIN — EPHEDRINE SULFATE 5 MG: 50 INJECTION INTRAMUSCULAR; INTRAVENOUS; SUBCUTANEOUS at 10:09

## 2017-12-27 RX ADMIN — DEXAMETHASONE SODIUM PHOSPHATE 8 MG: 10 INJECTION INTRAMUSCULAR; INTRAVENOUS at 10:30

## 2017-12-27 RX ADMIN — TOPIRAMATE 100 MG: 100 TABLET ORAL at 20:49

## 2017-12-27 RX ADMIN — MORPHINE SULFATE 1 MG: 2 INJECTION, SOLUTION INTRAMUSCULAR; INTRAVENOUS at 22:37

## 2017-12-27 RX ADMIN — FAMOTIDINE 20 MG: 20 TABLET, FILM COATED ORAL at 15:19

## 2017-12-27 RX ADMIN — MORPHINE SULFATE 1 MG: 2 INJECTION, SOLUTION INTRAMUSCULAR; INTRAVENOUS at 18:29

## 2017-12-27 RX ADMIN — Medication 2 MG: at 09:38

## 2017-12-27 RX ADMIN — IBUPROFEN 800 MG: 800 TABLET ORAL at 15:18

## 2017-12-27 RX ADMIN — EPHEDRINE SULFATE 5 MG: 50 INJECTION INTRAMUSCULAR; INTRAVENOUS; SUBCUTANEOUS at 10:18

## 2017-12-27 RX ADMIN — ONDANSETRON 4 MG: 2 INJECTION INTRAMUSCULAR; INTRAVENOUS at 11:30

## 2017-12-27 RX ADMIN — CLINDAMYCIN PHOSPHATE 900 MG: 900 INJECTION INTRAVENOUS at 09:50

## 2017-12-27 RX ADMIN — IBUPROFEN 800 MG: 800 TABLET ORAL at 22:16

## 2017-12-27 RX ADMIN — CYANOCOBALAMIN TAB 500 MCG 1000 MCG: 500 TAB at 15:18

## 2017-12-27 RX ADMIN — FAMOTIDINE 20 MG: 20 TABLET, FILM COATED ORAL at 20:50

## 2017-12-27 RX ADMIN — MULTIPLE VITAMINS W/ MINERALS TAB 1 TABLET: TAB at 15:19

## 2017-12-27 RX ADMIN — ATENOLOL 50 MG: 25 TABLET ORAL at 22:16

## 2017-12-27 RX ADMIN — POTASSIUM CHLORIDE AND SODIUM CHLORIDE 60 ML/HR: 900; 150 INJECTION, SOLUTION INTRAVENOUS at 15:17

## 2017-12-27 RX ADMIN — MORPHINE SULFATE 1 MG: 2 INJECTION, SOLUTION INTRAMUSCULAR; INTRAVENOUS at 14:17

## 2017-12-27 NOTE — ANESTHESIA PREPROCEDURE EVALUATION
Anesthesia Evaluation     Patient summary reviewed   NPO Solid Status: > 8 hours  NPO Liquid Status: > 8 hours     Airway   Mallampati: II  TM distance: >3 FB  Dental      Pulmonary    (+) a smoker Current Abstained day of surgery,   Cardiovascular     Rhythm: regular  Rate: normal    (+) valvular problems/murmurs MVP, hyperlipidemia      Neuro/Psych  (+) headaches,     GI/Hepatic/Renal/Endo      Musculoskeletal     (+) neck pain,   Abdominal    Substance History      OB/GYN          Other                                                Anesthesia Plan    ASA 2     general     intravenous induction   Anesthetic plan and risks discussed with patient.

## 2017-12-27 NOTE — ANESTHESIA POSTPROCEDURE EVALUATION
"Patient: Reba Banerjee    Procedure Summary     Date Anesthesia Start Anesthesia Stop Room / Location    12/27/17 0950 1154 [facility] OR 20 / [facility]       Procedure Diagnosis Surgeon Provider    Anterior cervical discectomy C5 6 with disc replacement (Right Spine Cervical) Cervical disc herniation  (Cervical disc herniation [M50.20]) MD Nehal Miles MD          Anesthesia Type: general  Last vitals  BP   117/65 (12/27/17 1225)   Temp   36.3 °C (97.4 °F) (12/27/17 1214)   Pulse   58 (12/27/17 1225)   Resp   16 (12/27/17 1225)     SpO2   100 % (12/27/17 1225)     Post Anesthesia Care and Evaluation    Patient location during evaluation: PACU  Patient participation: complete - patient participated  Level of consciousness: awake  Pain score: 0  Pain management: adequate  Airway patency: patent  Anesthetic complications: No anesthetic complications    Cardiovascular status: acceptable  Respiratory status: acceptable  Hydration status: acceptable    Comments: Blood pressure 117/65, pulse 58, temperature 36.3 °C (97.4 °F), temperature source Oral, resp. rate 16, height 170.2 cm (67\"), weight 66 kg (145 lb 8 oz), SpO2 100 %.    No anesthesia care post op    "

## 2017-12-27 NOTE — ANESTHESIA PROCEDURE NOTES
Airway  Urgency: elective    Airway not difficult    General Information and Staff    Patient location during procedure: OR  Anesthesiologist: RENO SIMMONS  CRNA: MOUSTAPHA SOLITARIO    Indications and Patient Condition  Indications for airway management: airway protection    Preoxygenated: yes  MILS not maintained throughout  Mask difficulty assessment: 1 - vent by mask    Final Airway Details  Final airway type: endotracheal airway      Successful airway: ETT  Cuffed: yes   Successful intubation technique: direct laryngoscopy  Facilitating devices/methods: intubating stylet  Endotracheal tube insertion site: oral  Blade: Shirlene  Blade size: #3  ETT size: 7.0 mm  Cormack-Lehane Classification: grade I - full view of glottis  Placement verified by: chest auscultation   Cuff volume (mL): 8  Measured from: lips  ETT to lips (cm): 21  Number of attempts at approach: 1    Additional Comments  PreO2 100% face mask, IV induction, easy mask, DVL x1, cords noted, tube through, cuff up, EBBSH, +etCO2, = chest movement, tube secured in place, atraumatic, teeth and lips intact as preop.

## 2017-12-27 NOTE — TELEPHONE ENCOUNTER
Dr. Holloway reviewed recent images and confirmed that patient will be having a cervical disc replacement.

## 2017-12-28 VITALS
HEART RATE: 62 BPM | TEMPERATURE: 98 F | SYSTOLIC BLOOD PRESSURE: 95 MMHG | RESPIRATION RATE: 18 BRPM | HEIGHT: 67 IN | BODY MASS INDEX: 22.84 KG/M2 | DIASTOLIC BLOOD PRESSURE: 58 MMHG | WEIGHT: 145.5 LBS | OXYGEN SATURATION: 96 %

## 2017-12-28 LAB
BASOPHILS # BLD AUTO: 0.02 10*3/MM3 (ref 0–0.2)
BASOPHILS NFR BLD AUTO: 0.2 % (ref 0–1.5)
DEPRECATED RDW RBC AUTO: 46.2 FL (ref 37–54)
EOSINOPHIL # BLD AUTO: 0.05 10*3/MM3 (ref 0–0.7)
EOSINOPHIL NFR BLD AUTO: 0.4 % (ref 0.3–6.2)
ERYTHROCYTE [DISTWIDTH] IN BLOOD BY AUTOMATED COUNT: 13 % (ref 11.7–13)
HCT VFR BLD AUTO: 40 % (ref 35.6–45.5)
HGB BLD-MCNC: 12.4 G/DL (ref 11.9–15.5)
IMM GRANULOCYTES # BLD: 0.02 10*3/MM3 (ref 0–0.03)
IMM GRANULOCYTES NFR BLD: 0.2 % (ref 0–0.5)
LYMPHOCYTES # BLD AUTO: 2.69 10*3/MM3 (ref 0.9–4.8)
LYMPHOCYTES NFR BLD AUTO: 24.1 % (ref 19.6–45.3)
MCH RBC QN AUTO: 30.1 PG (ref 26.9–32)
MCHC RBC AUTO-ENTMCNC: 31 G/DL (ref 32.4–36.3)
MCV RBC AUTO: 97.1 FL (ref 80.5–98.2)
MONOCYTES # BLD AUTO: 0.67 10*3/MM3 (ref 0.2–1.2)
MONOCYTES NFR BLD AUTO: 6 % (ref 5–12)
NEUTROPHILS # BLD AUTO: 7.71 10*3/MM3 (ref 1.9–8.1)
NEUTROPHILS NFR BLD AUTO: 69.1 % (ref 42.7–76)
PLATELET # BLD AUTO: 240 10*3/MM3 (ref 140–500)
PMV BLD AUTO: 10.6 FL (ref 6–12)
RBC # BLD AUTO: 4.12 10*6/MM3 (ref 3.9–5.2)
WBC NRBC COR # BLD: 11.16 10*3/MM3 (ref 4.5–10.7)

## 2017-12-28 PROCEDURE — 99024 POSTOP FOLLOW-UP VISIT: CPT | Performed by: NURSE PRACTITIONER

## 2017-12-28 PROCEDURE — 25010000002 MORPHINE PER 10 MG: Performed by: NEUROLOGICAL SURGERY

## 2017-12-28 PROCEDURE — 85025 COMPLETE CBC W/AUTO DIFF WBC: CPT | Performed by: NEUROLOGICAL SURGERY

## 2017-12-28 PROCEDURE — 94799 UNLISTED PULMONARY SVC/PX: CPT

## 2017-12-28 PROCEDURE — 97162 PT EVAL MOD COMPLEX 30 MIN: CPT

## 2017-12-28 RX ORDER — IBUPROFEN 600 MG/1
600 TABLET ORAL EVERY 8 HOURS SCHEDULED
Qty: 42 TABLET | Refills: 0 | Status: SHIPPED | OUTPATIENT
Start: 2017-12-28 | End: 2018-01-10 | Stop reason: SDUPTHER

## 2017-12-28 RX ORDER — PSEUDOEPHEDRINE HCL 30 MG
100 TABLET ORAL 2 TIMES DAILY PRN
Qty: 60 CAPSULE | Refills: 0 | Status: SHIPPED | OUTPATIENT
Start: 2017-12-28 | End: 2018-02-07

## 2017-12-28 RX ORDER — HYDROCODONE BITARTRATE AND ACETAMINOPHEN 7.5; 325 MG/1; MG/1
TABLET ORAL
Qty: 40 TABLET | Refills: 0 | Status: SHIPPED | OUTPATIENT
Start: 2017-12-28 | End: 2018-01-10 | Stop reason: SDUPTHER

## 2017-12-28 RX ORDER — FAMOTIDINE 20 MG/1
20 TABLET, FILM COATED ORAL DAILY
Qty: 30 TABLET | Refills: 0 | Status: SHIPPED | OUTPATIENT
Start: 2017-12-28 | End: 2018-02-07

## 2017-12-28 RX ORDER — HYDROCODONE BITARTRATE AND ACETAMINOPHEN 7.5; 325 MG/1; MG/1
2 TABLET ORAL EVERY 4 HOURS PRN
Status: DISCONTINUED | OUTPATIENT
Start: 2017-12-28 | End: 2017-12-28 | Stop reason: HOSPADM

## 2017-12-28 RX ADMIN — PANTOPRAZOLE SODIUM 40 MG: 40 TABLET, DELAYED RELEASE ORAL at 06:52

## 2017-12-28 RX ADMIN — CYANOCOBALAMIN TAB 500 MCG 1000 MCG: 500 TAB at 08:56

## 2017-12-28 RX ADMIN — HYDROCODONE BITARTRATE AND ACETAMINOPHEN 2 TABLET: 7.5; 325 TABLET ORAL at 12:57

## 2017-12-28 RX ADMIN — ATORVASTATIN CALCIUM 20 MG: 20 TABLET, FILM COATED ORAL at 08:56

## 2017-12-28 RX ADMIN — MORPHINE SULFATE 1 MG: 2 INJECTION, SOLUTION INTRAMUSCULAR; INTRAVENOUS at 06:37

## 2017-12-28 RX ADMIN — DOCUSATE SODIUM 100 MG: 100 CAPSULE, LIQUID FILLED ORAL at 08:55

## 2017-12-28 RX ADMIN — FAMOTIDINE 20 MG: 20 TABLET, FILM COATED ORAL at 08:56

## 2017-12-28 RX ADMIN — IBUPROFEN 800 MG: 800 TABLET ORAL at 06:52

## 2017-12-28 RX ADMIN — TOPIRAMATE 50 MG: 50 TABLET ORAL at 06:54

## 2017-12-28 RX ADMIN — MULTIPLE VITAMINS W/ MINERALS TAB 1 TABLET: TAB at 08:56

## 2017-12-28 RX ADMIN — BACLOFEN 20 MG: 10 TABLET ORAL at 08:56

## 2017-12-28 RX ADMIN — GABAPENTIN 600 MG: 300 CAPSULE ORAL at 06:52

## 2017-12-28 RX ADMIN — HYDROCODONE BITARTRATE AND ACETAMINOPHEN 2 TABLET: 7.5; 325 TABLET ORAL at 08:55

## 2018-01-10 ENCOUNTER — HOSPITAL ENCOUNTER (OUTPATIENT)
Dept: GENERAL RADIOLOGY | Facility: HOSPITAL | Age: 53
Discharge: HOME | End: 2018-01-10
Admitting: NURSE PRACTITIONER

## 2018-01-10 ENCOUNTER — OFFICE VISIT (OUTPATIENT)
Dept: NEUROSURGERY | Facility: CLINIC | Age: 53
End: 2018-01-10

## 2018-01-10 VITALS
BODY MASS INDEX: 22.87 KG/M2 | TEMPERATURE: 97.9 F | HEART RATE: 68 BPM | RESPIRATION RATE: 16 BRPM | SYSTOLIC BLOOD PRESSURE: 90 MMHG | DIASTOLIC BLOOD PRESSURE: 58 MMHG | WEIGHT: 146 LBS

## 2018-01-10 DIAGNOSIS — Z98.890 POST-OPERATIVE STATE: ICD-10-CM

## 2018-01-10 DIAGNOSIS — M50.20 DISPLACEMENT OF CERVICAL INTERVERTEBRAL DISC WITHOUT MYELOPATHY: ICD-10-CM

## 2018-01-10 DIAGNOSIS — M54.2 POSTERIOR NECK PAIN: ICD-10-CM

## 2018-01-10 DIAGNOSIS — Z98.890 POST-OPERATIVE STATE: Primary | ICD-10-CM

## 2018-01-10 PROCEDURE — 99024 POSTOP FOLLOW-UP VISIT: CPT | Performed by: NURSE PRACTITIONER

## 2018-01-10 PROCEDURE — 72052 X-RAY EXAM NECK SPINE 6/>VWS: CPT

## 2018-01-10 RX ORDER — IBUPROFEN 600 MG/1
600 TABLET ORAL EVERY 8 HOURS SCHEDULED
Qty: 42 TABLET | Refills: 0 | Status: SHIPPED | OUTPATIENT
Start: 2018-01-10 | End: 2018-03-19

## 2018-01-10 RX ORDER — DOXEPIN HYDROCHLORIDE 50 MG/G
CREAM TOPICAL AS NEEDED
COMMUNITY
Start: 2017-12-27 | End: 2021-10-19

## 2018-01-10 RX ORDER — HYDROCODONE BITARTRATE AND ACETAMINOPHEN 7.5; 325 MG/1; MG/1
TABLET ORAL
Qty: 35 TABLET | Refills: 0 | Status: SHIPPED | OUTPATIENT
Start: 2018-01-10 | End: 2018-01-24 | Stop reason: ALTCHOICE

## 2018-01-10 RX ORDER — LIDOCAINE 50 MG/G
OINTMENT TOPICAL AS NEEDED
COMMUNITY
Start: 2017-12-27 | End: 2021-10-19

## 2018-01-10 NOTE — PROGRESS NOTES
" HPI:   Reba Banerjee is a 52 y.o. female for follow-up of cervical disc herniation. She is status post C5/6 discectomy/disc replacement on December 27, 2017. She was discharged to home. She has not had postoperative complications.    She is doing well following one level cervical disc replacement Dr. Holloway on December 27.  She has had complete resolution of the \"severe\" left arm pain, which was the major issue preoperatively.  She does have expected posterior neck and upper back muscular discomfort.  She also continues to have some difficulty with swallowing, but denies any choking.  She states that it is getting much easier to swallow.  The incision site is healing well.  She reports that she still feels very weak and tired from surgery.  She is trying to increase her activity level slowly.  She continues to take the muscle relaxers 3 times daily and is taking hydrocodone every 8 hours.  She is trying to wean off of the narcotic as tolerated.  She is supplementing with Tylenol and Aleve.  She is requesting an additional refill of the prescription ibuprofen and hydrocodone.    She presents accompanied by her spouse.       Review of Systems   Constitutional: Negative for fever.   HENT: Positive for sore throat and trouble swallowing.    Endocrine: Positive for heat intolerance (increase in heat flushing).   Musculoskeletal: Positive for neck pain and neck stiffness.   Neurological: Positive for weakness ( bilaterally) and numbness (fingers/thumbs slightly improved).        BP 90/58 (BP Location: Left arm, Patient Position: Sitting, Cuff Size: Adult)  Pulse 68  Temp 97.9 °F (36.6 °C) (Tympanic)   Resp 16  Wt 66.2 kg (146 lb)  BMI 22.87 kg/m2    Physical Exam   Constitutional: She is oriented to person, place, and time. Vital signs are normal. She appears well-nourished. She is cooperative.  Non-toxic appearance. She does not have a sickly appearance. She does not appear ill.   Appears tired   HENT:   Head: " Atraumatic.   Neck: Neck supple. No tracheal deviation present.   Well-healing right sided anterior incision site, flat, clean dry and intact, normal surrounding skin.  Minimal residual surgical glue remains   Pulmonary/Chest: Effort normal and breath sounds normal. No respiratory distress.   Abdominal: Soft.   Musculoskeletal: Normal range of motion. She exhibits tenderness (Posterior upper back and neck tenderness to palpation). She exhibits no deformity.   Muscular tightness with cervical range of motion   Neurological: She is alert and oriented to person, place, and time. She has normal strength. She displays no tremor and normal reflexes. No cranial nerve deficit or sensory deficit. She exhibits normal muscle tone. Coordination and gait normal. GCS eye subscore is 4. GCS verbal subscore is 5. GCS motor subscore is 6.   Normal motor and sensory exam bilateral upper extremities     Skin: Skin is warm and dry.   Psychiatric: She has a normal mood and affect. Her behavior is normal. Judgment and thought content normal.   Vitals reviewed.    Neurologic Exam     Mental Status   Oriented to person, place, and time.     Motor Exam     Strength   Strength 5/5 throughout.       Findings/Results:  Postop cervical x-rays dated January 10, 2018 reveals expected postoperative findings with disc replacement, no new abnormalities noted    Assessment/Plan:  Reba was seen today for post-op.    Diagnoses and all orders for this visit:    Post-operative state    Posterior neck pain    Other orders  -     HYDROcodone-acetaminophen (NORCO) 7.5-325 MG per tablet; 1-2 PO q 6 hours PRN  -     ibuprofen (ADVIL,MOTRIN) 600 MG tablet; Take 1 tablet by mouth Every 8 (Eight) Hours.        Discussion/Summary  She returns to the office today for first postop visit status post one level cervical disc replacement Dr. Holloway.  Exam as noted above, no red flags.  She is doing very well but still continues on a lot of medication at this time.   I encouraged her to cut back on the hydrocodone twice daily and then once daily over the course of the next few days.  I've given her a refill on the hydrocodone and the ibuprofen.  I also encouraged her to increase her cervical range of motion secondary to ongoing muscular tightness.  She should do this following the use of heat on the back of her neck and upper back.  The incision site is healing well and looks great.      The preoperative left arm pain has resolved.  She does not want to do any physical therapy but rather wants to do home exercises as tolerated.  She is okay to resume driving when she is completely off all narcotic medication.  She is not to get it on any exercise equipment, such as an elliptical machine until she is off the narcotics as well.  She should refrain from strenuous activity for 2 more weeks to allow for complete incisional healing.  We'll see her back in one month for clinical follow-up and for discussion of returning to work.  She is to take medications as directed.    Plan: Return to office in one month for follow-up visit with return to work consideration    Plan: Return in about 4 weeks (around 2/7/2018).         Patient Care Team    Patient Care Team:  Katty Hairston MD as PCP - General  Katty Hairston MD as PCP - Family Medicine    Angela Cooper, APRN  1/10/2018    Dragon disclaimer:   Much of this encounter note is an electronic transcription/translation of spoken language to printed text. The electronic translation of spoken language may permit erroneous, or at times, nonsensical words or phrases to be inadvertently transcribed; Although I have reviewed the note for such errors, some may still exist.

## 2018-01-24 RX ORDER — HYDROCODONE BITARTRATE AND ACETAMINOPHEN 5; 325 MG/1; MG/1
1 TABLET ORAL EVERY 8 HOURS PRN
Qty: 30 TABLET | Refills: 0 | Status: SHIPPED | OUTPATIENT
Start: 2018-01-24 | End: 2018-03-19

## 2018-01-24 RX ORDER — FAMOTIDINE 20 MG/1
TABLET, FILM COATED ORAL
Qty: 30 TABLET | Refills: 0 | OUTPATIENT
Start: 2018-01-24

## 2018-01-24 NOTE — TELEPHONE ENCOUNTER
The request I received was for Pepcid to be taken while on ibuprofen. She was only to be on the ibuprofen for 2 weeks. She can stop both. I will give her one refill on Norco but stepping down to 5 mg. She needs to wean off. We will not fill after this. I don't understand why she increased her gabapentin if her nerve pain was gone. She should be weaning off. This can be addressed at her visit.

## 2018-01-24 NOTE — TELEPHONE ENCOUNTER
Pt called in.  She has 4 pills left of her Hydrocodone 7.5-325mg  (1 every 12 hours) Her Sx with [facility] for ACDF was 12/27.  Pt is requesting a refill.    She also dates she only as 16 Ibuprofen 600mg (1 every 8 hours) and she believes she may to have enough to last until her 2/7 appt with [facility]    [phone] is her best contact number.

## 2018-01-24 NOTE — TELEPHONE ENCOUNTER
"I spoke to patient. She reports \"nerve pain\" in her left arm (which Angela noted to be \"resolved\" at last visit 1-10-18).  She is currently taking gabapentin 600 mg TID (her PCP instructed her to increase from BID to TID about a week ago), along with hydrocodone 7.5/325 one in the morning and before bed. Supplements this with 600 mg ibuprofen during day x2.    Please advise.       "

## 2018-02-01 RX ORDER — FAMOTIDINE 20 MG/1
TABLET, FILM COATED ORAL
Qty: 30 TABLET | Refills: 0 | OUTPATIENT
Start: 2018-02-01

## 2018-02-07 ENCOUNTER — OFFICE VISIT (OUTPATIENT)
Dept: NEUROSURGERY | Facility: CLINIC | Age: 53
End: 2018-02-07

## 2018-02-07 VITALS
RESPIRATION RATE: 18 BRPM | SYSTOLIC BLOOD PRESSURE: 108 MMHG | HEIGHT: 67 IN | BODY MASS INDEX: 22.76 KG/M2 | HEART RATE: 84 BPM | DIASTOLIC BLOOD PRESSURE: 70 MMHG | WEIGHT: 145 LBS

## 2018-02-07 DIAGNOSIS — M54.2 POSTERIOR NECK PAIN: ICD-10-CM

## 2018-02-07 DIAGNOSIS — Z98.890 POST-OPERATIVE STATE: ICD-10-CM

## 2018-02-07 DIAGNOSIS — M54.12 CERVICAL RADICULOPATHY: Primary | ICD-10-CM

## 2018-02-07 PROCEDURE — 99024 POSTOP FOLLOW-UP VISIT: CPT | Performed by: NURSE PRACTITIONER

## 2018-02-07 NOTE — PROGRESS NOTES
"Subjective   Patient ID: Reba Banerjee is a 52 y.o. female is here today for follow-up neck pain s/p C5/6 disc 12/27. Patient presents unaccompanied.     History of Present Illness    She returns to the office today for 6 week follow-up status post C5 6 ACDF on 12/27/2017.  She had complete resolution of preoperative \"severe\" left arm pain.  Physical therapy was recommended at her last office visit but she opted to do home exercises instead.  She was weaning off the narcotic medications, but she called a couple days and asked for a refill.  The left arm pain has improved postop, but she has had an exacerbation of her RSD.  She is unable to differentiate between her preop pain in the RSD, with the exception of cessation of the shooting left arm pain that she had preop.  She is been increasing her activity level slowly at home.  She does not feel that she is ready to return to work.  She would like more time to work on her strength.  She is weaning off the hydrocodone prescription was prescribed last week.    She presents unaccompanied.      /70 (BP Location: Left arm, Patient Position: Sitting)  Pulse 84  Resp 18  Ht 170.2 cm (67\")  Wt 65.8 kg (145 lb)  BMI 22.71 kg/m2      The following portions of the patient's history were reviewed and updated as appropriate: allergies, current medications, past family history, past medical history, past social history, past surgical history and problem list.    Review of Systems   Musculoskeletal: Positive for neck pain (occ'l) and neck stiffness.        BUE pain   Neurological: Positive for weakness (BUE) and numbness (bilateral hands).   Psychiatric/Behavioral: Positive for sleep disturbance (occ'l).       Objective   Physical Exam   Constitutional: She is oriented to person, place, and time. Vital signs are normal. She appears well-developed and well-nourished. She is cooperative.  Non-toxic appearance. She does not have a sickly appearance. She does not appear " ill.   Pleasant middle-aged female, smells heavily of cigarette smoke   HENT:   Head: Atraumatic.   Neck: Normal range of motion. Neck supple. No tracheal deviation present.   Pulmonary/Chest: Effort normal.   Abdominal: Soft.   Musculoskeletal: Normal range of motion. She exhibits tenderness (Bilateral posterior neck and upper back tenderness).   Strength equal bilateral upper extremities  Full cervical range of motion, pain with extension   Neurological: She is alert and oriented to person, place, and time. She has normal strength. She displays no tremor. No cranial nerve deficit. She displays no seizure activity. Coordination and gait normal. GCS eye subscore is 4. GCS verbal subscore is 5. GCS motor subscore is 6.   Stable upright gait   Skin: Skin is warm and dry.   Mottling of skin on both arms, right greater than left, as well as torso and neck   well-healed anterior right sided incision site, flat, normal surrounding skin     Psychiatric: She has a normal mood and affect. Her behavior is normal. Judgment and thought content normal.   Vitals reviewed.    Neurologic Exam     Mental Status   Oriented to person, place, and time.     Motor Exam     Strength   Strength 5/5 throughout.       Assessment/Plan   Independent Review of Radiographic Studies:    No new imaging      Medical Decision Making:    She returns the office today for 6 week postop visit.  Exam as noted above, no red flags.  Since surgery, she is having exacerbation of her RSD.  Secondary to the pain that this is causing, she has had a hard time increasing her activity level.  I've ordered her to begin physical therapy for return to work conditioning.  Specifically, with the goal of increasing strength and mobility of her arms and neck.  I also encouraged her to do daily walking.  The incision site has healed well.  Her strength is equal in both arms.  We'll plan on seeing her back in 4-6 weeks following physical therapy for return to work  evaluation.  She is to wean off the hydrocodone.  No additional narcotic prescriptions will be given to her.  Overall, she is doing very well.    Plan: Referral to physical therapy, cervical flexion and extension x-rays and return to office in 6 weeks  Reba was seen today for neck pain.    Diagnoses and all orders for this visit:    Cervical radiculopathy  -     Ambulatory Referral to Physical Therapy Evaluate and treat  -     XR spine cervical complete w flex ext; Future    Posterior neck pain  -     Ambulatory Referral to Physical Therapy Evaluate and treat  -     XR spine cervical complete w flex ext; Future    Post-operative state  -     Ambulatory Referral to Physical Therapy Evaluate and treat  -     XR spine cervical complete w flex ext; Future      Return in about 6 weeks (around 3/21/2018).

## 2018-03-19 ENCOUNTER — OFFICE VISIT (OUTPATIENT)
Dept: NEUROSURGERY | Facility: CLINIC | Age: 53
End: 2018-03-19

## 2018-03-19 ENCOUNTER — HOSPITAL ENCOUNTER (OUTPATIENT)
Dept: GENERAL RADIOLOGY | Facility: HOSPITAL | Age: 53
Discharge: HOME | End: 2018-03-19
Admitting: NURSE PRACTITIONER

## 2018-03-19 VITALS
RESPIRATION RATE: 18 BRPM | WEIGHT: 149 LBS | DIASTOLIC BLOOD PRESSURE: 72 MMHG | HEART RATE: 80 BPM | BODY MASS INDEX: 23.39 KG/M2 | SYSTOLIC BLOOD PRESSURE: 110 MMHG | HEIGHT: 67 IN

## 2018-03-19 DIAGNOSIS — Z98.890 POST-OPERATIVE STATE: Primary | ICD-10-CM

## 2018-03-19 DIAGNOSIS — Z98.890 POST-OPERATIVE STATE: ICD-10-CM

## 2018-03-19 DIAGNOSIS — M54.2 POSTERIOR NECK PAIN: ICD-10-CM

## 2018-03-19 DIAGNOSIS — M54.12 CERVICAL RADICULOPATHY: ICD-10-CM

## 2018-03-19 PROCEDURE — 99024 POSTOP FOLLOW-UP VISIT: CPT | Performed by: NURSE PRACTITIONER

## 2018-03-19 PROCEDURE — 72052 X-RAY EXAM NECK SPINE 6/>VWS: CPT

## 2018-03-19 NOTE — PROGRESS NOTES
"Subjective   Patient ID: Reba Banerjee is a 52 y.o. female is here today for follow-up neck pain s/p C5/6 disc 12/27. Patient presents unaccompanied.     History of Present Illness    She returns to the office today for 3 month postop follow-up status post one level disc replacement with Dr. Holloway on December 27, 2017.  She has had follow-up cervical x-ray imaging.  She has been doing physical therapy for the past 6 weeks since her last office visit for ongoing left arm discomfort and reported weakness.  She reports feeling much better today.  Left arm pain has completely resolved, she does have bilateral numbness and tingling in both arms and hands, which is due to her RSD.    She is ready to return to work without restriction.  She denies any new problems or concerns.  She has some ongoing discomfort in her mid upper back, but this is stable.    She presents unaccompanied.      /72 (BP Location: Left arm, Patient Position: Sitting)   Pulse 80   Resp 18   Ht 170.2 cm (67\")   Wt 67.6 kg (149 lb)   BMI 23.34 kg/m²         The following portions of the patient's history were reviewed and updated as appropriate: allergies, current medications, past family history, past medical history, past social history, past surgical history and problem list.    Review of Systems   Genitourinary: Negative for difficulty urinating and enuresis.   Musculoskeletal: Positive for neck pain (\"nerve pain\") and neck stiffness.        BUE pain   Neurological: Positive for weakness (BUE, hands) and numbness (BUE).   Psychiatric/Behavioral: Positive for sleep disturbance (occ'l).       Objective   Physical Exam   Constitutional: She is oriented to person, place, and time. She appears well-developed and well-nourished. She is cooperative.  Non-toxic appearance. She does not have a sickly appearance. She does not appear ill.   HENT:   Head: Atraumatic.   Neck: Neck supple. No tracheal deviation present.   Pulmonary/Chest: Effort " normal.   Abdominal: Soft.   Musculoskeletal: Normal range of motion. She exhibits no tenderness.   Moves all extremities well  Strength equal and normal throughout  Full cervical range of motion without pain  Well-healed anterior incision site   Neurological: She is alert and oriented to person, place, and time. She has normal strength and normal reflexes. No sensory deficit. Coordination and gait normal. GCS eye subscore is 4. GCS verbal subscore is 5. GCS motor subscore is 6.   Normal motor and sensory examination   Skin: Skin is warm and dry.   Psychiatric: She has a normal mood and affect. Her behavior is normal.   Vitals reviewed.    Neurologic Exam     Mental Status   Oriented to person, place, and time.     Motor Exam     Strength   Strength 5/5 throughout.       Assessment/Plan   Independent Review of Radiographic Studies:    Cervical x-rays dated 3/19/18 reveals stable findings with no new abnormalities noted.  Disc replacement at C5 6 shows stable postoperative changes    Medical Decision Making:    She returns the office today for 3 month postop visit status post one level disc replacement.  Exam as noted above, no red flags.  She is in very well postop and has no ongoing left arm pain.  She does continue with more chronic pain syndrome issues as result of the RSD.  From our standpoint, she is able to return to work without restriction.  Postop x-rays reveal stable findings with regard to the disc replacement.  I have filled out her [facility] paperwork stating that she can return to work on March 28 and can resume all normal work activities as indicated.  She is to call our office at anytime with any questions or concerns.  She also has no ongoing restrictions from a neurosurgical standpoint postop.    Plan: Return to work without restriction March 28, return to office as needed  Reba was seen today for neck pain.    Diagnoses and all orders for this visit:    Post-operative state      Return if symptoms  worsen or fail to improve.

## 2018-08-21 ENCOUNTER — OFFICE VISIT (OUTPATIENT)
Dept: GASTROENTEROLOGY | Facility: CLINIC | Age: 53
End: 2018-08-21

## 2018-08-21 VITALS
DIASTOLIC BLOOD PRESSURE: 68 MMHG | TEMPERATURE: 98.3 F | HEIGHT: 67 IN | WEIGHT: 139.4 LBS | BODY MASS INDEX: 21.88 KG/M2 | SYSTOLIC BLOOD PRESSURE: 124 MMHG

## 2018-08-21 DIAGNOSIS — R11.2 NON-INTRACTABLE VOMITING WITH NAUSEA, UNSPECIFIED VOMITING TYPE: Primary | ICD-10-CM

## 2018-08-21 DIAGNOSIS — R10.10 PAIN OF UPPER ABDOMEN: ICD-10-CM

## 2018-08-21 DIAGNOSIS — R11.0 NAUSEA: ICD-10-CM

## 2018-08-21 DIAGNOSIS — R93.89 ABNORMAL FINDING ON RADIOLOGY EXAM: ICD-10-CM

## 2018-08-21 DIAGNOSIS — R79.89 ABNORMAL LIVER FUNCTION TESTS: ICD-10-CM

## 2018-08-21 DIAGNOSIS — R10.30 LOWER ABDOMINAL PAIN: ICD-10-CM

## 2018-08-21 DIAGNOSIS — R14.0 ABDOMINAL BLOATING: ICD-10-CM

## 2018-08-21 PROCEDURE — 99204 OFFICE O/P NEW MOD 45 MIN: CPT | Performed by: INTERNAL MEDICINE

## 2018-08-21 RX ORDER — DICYCLOMINE HCL 20 MG
TABLET ORAL
COMMUNITY
Start: 2018-08-03 | End: 2018-10-22
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
## 2018-08-22 LAB
ALBUMIN SERPL-MCNC: 4.4 G/DL (ref 3.5–5.5)
ALBUMIN/GLOB SERPL: 1.8 {RATIO} (ref 1.2–2.2)
ALP SERPL-CCNC: 108 IU/L (ref 39–117)
ALT SERPL-CCNC: 21 IU/L (ref 0–32)
AMYLASE SERPL-CCNC: 58 U/L (ref 31–124)
AST SERPL-CCNC: 21 IU/L (ref 0–40)
BASOPHILS # BLD AUTO: 0 X10E3/UL (ref 0–0.2)
BASOPHILS NFR BLD AUTO: 1 %
BILIRUB SERPL-MCNC: 0.2 MG/DL (ref 0–1.2)
BUN SERPL-MCNC: 7 MG/DL (ref 6–24)
BUN/CREAT SERPL: 8 (ref 9–23)
CALCIUM SERPL-MCNC: 8.9 MG/DL (ref 8.7–10.2)
CHLORIDE SERPL-SCNC: 109 MMOL/L (ref 96–106)
CO2 SERPL-SCNC: 22 MMOL/L (ref 20–29)
CREAT SERPL-MCNC: 0.87 MG/DL (ref 0.57–1)
EOSINOPHIL # BLD AUTO: 0.2 X10E3/UL (ref 0–0.4)
EOSINOPHIL NFR BLD AUTO: 3 %
ERYTHROCYTE [DISTWIDTH] IN BLOOD BY AUTOMATED COUNT: 13.5 % (ref 12.3–15.4)
GLOBULIN SER CALC-MCNC: 2.5 G/DL (ref 1.5–4.5)
GLUCOSE SERPL-MCNC: 89 MG/DL (ref 65–99)
HCT VFR BLD AUTO: 43.1 % (ref 34–46.6)
HGB BLD-MCNC: 14.2 G/DL (ref 11.1–15.9)
IMM GRANULOCYTES # BLD: 0 X10E3/UL (ref 0–0.1)
IMM GRANULOCYTES NFR BLD: 0 %
LIPASE SERPL-CCNC: 48 U/L (ref 14–72)
LYMPHOCYTES # BLD AUTO: 2.3 X10E3/UL (ref 0.7–3.1)
LYMPHOCYTES NFR BLD AUTO: 38 %
MCH RBC QN AUTO: 30.2 PG (ref 26.6–33)
MCHC RBC AUTO-ENTMCNC: 32.9 G/DL (ref 31.5–35.7)
MCV RBC AUTO: 92 FL (ref 79–97)
MONOCYTES # BLD AUTO: 0.2 X10E3/UL (ref 0.1–0.9)
MONOCYTES NFR BLD AUTO: 3 %
NEUTROPHILS # BLD AUTO: 3.4 X10E3/UL (ref 1.4–7)
NEUTROPHILS NFR BLD AUTO: 55 %
PLATELET # BLD AUTO: 299 X10E3/UL (ref 150–379)
POTASSIUM SERPL-SCNC: 4.4 MMOL/L (ref 3.5–5.2)
PROT SERPL-MCNC: 6.9 G/DL (ref 6–8.5)
RBC # BLD AUTO: 4.7 X10E6/UL (ref 3.77–5.28)
SODIUM SERPL-SCNC: 143 MMOL/L (ref 134–144)
WBC # BLD AUTO: 6.1 X10E3/UL (ref 3.4–10.8)

## 2018-08-28 ENCOUNTER — ANESTHESIA EVENT (OUTPATIENT)
Dept: GASTROENTEROLOGY | Facility: HOSPITAL | Age: 53
End: 2018-08-28

## 2018-08-28 ENCOUNTER — TELEPHONE (OUTPATIENT)
Dept: GASTROENTEROLOGY | Facility: CLINIC | Age: 53
End: 2018-08-28

## 2018-08-28 ENCOUNTER — ANESTHESIA (OUTPATIENT)
Dept: GASTROENTEROLOGY | Facility: HOSPITAL | Age: 53
End: 2018-08-28

## 2018-08-28 ENCOUNTER — HOSPITAL ENCOUNTER (OUTPATIENT)
Facility: HOSPITAL | Age: 53
Setting detail: HOSPITAL OUTPATIENT SURGERY
Discharge: HOME | End: 2018-08-28
Attending: INTERNAL MEDICINE | Admitting: INTERNAL MEDICINE

## 2018-08-28 VITALS
TEMPERATURE: 97.7 F | HEART RATE: 80 BPM | OXYGEN SATURATION: 99 % | RESPIRATION RATE: 20 BRPM | DIASTOLIC BLOOD PRESSURE: 62 MMHG | SYSTOLIC BLOOD PRESSURE: 103 MMHG

## 2018-08-28 DIAGNOSIS — R11.2 NON-INTRACTABLE VOMITING WITH NAUSEA, UNSPECIFIED VOMITING TYPE: ICD-10-CM

## 2018-08-28 DIAGNOSIS — R10.30 LOWER ABDOMINAL PAIN: ICD-10-CM

## 2018-08-28 DIAGNOSIS — R11.0 NAUSEA: ICD-10-CM

## 2018-08-28 DIAGNOSIS — R93.89 ABNORMAL FINDING ON RADIOLOGY EXAM: ICD-10-CM

## 2018-08-28 DIAGNOSIS — R10.10 PAIN OF UPPER ABDOMEN: ICD-10-CM

## 2018-08-28 DIAGNOSIS — R79.89 ABNORMAL LIVER FUNCTION TESTS: ICD-10-CM

## 2018-08-28 DIAGNOSIS — R14.0 ABDOMINAL BLOATING: ICD-10-CM

## 2018-08-28 PROCEDURE — 43239 EGD BIOPSY SINGLE/MULTIPLE: CPT | Performed by: INTERNAL MEDICINE

## 2018-08-28 PROCEDURE — 45380 COLONOSCOPY AND BIOPSY: CPT | Performed by: INTERNAL MEDICINE

## 2018-08-28 PROCEDURE — 25010000002 PROPOFOL 10 MG/ML EMULSION: Performed by: ANESTHESIOLOGY

## 2018-08-28 PROCEDURE — 88312 SPECIAL STAINS GROUP 1: CPT | Performed by: INTERNAL MEDICINE

## 2018-08-28 PROCEDURE — 88305 TISSUE EXAM BY PATHOLOGIST: CPT | Performed by: INTERNAL MEDICINE

## 2018-08-28 RX ORDER — PROPOFOL 10 MG/ML
VIAL (ML) INTRAVENOUS AS NEEDED
Status: DISCONTINUED | OUTPATIENT
Start: 2018-08-28 | End: 2018-08-28 | Stop reason: SURG

## 2018-08-28 RX ORDER — SODIUM CHLORIDE, SODIUM LACTATE, POTASSIUM CHLORIDE, CALCIUM CHLORIDE 600; 310; 30; 20 MG/100ML; MG/100ML; MG/100ML; MG/100ML
1000 INJECTION, SOLUTION INTRAVENOUS CONTINUOUS
Status: DISCONTINUED | OUTPATIENT
Start: 2018-08-28 | End: 2018-08-28 | Stop reason: HOSPADM

## 2018-08-28 RX ORDER — PROPOFOL 10 MG/ML
VIAL (ML) INTRAVENOUS CONTINUOUS PRN
Status: DISCONTINUED | OUTPATIENT
Start: 2018-08-28 | End: 2018-08-28 | Stop reason: SURG

## 2018-08-28 RX ORDER — LIDOCAINE HYDROCHLORIDE 20 MG/ML
INJECTION, SOLUTION INFILTRATION; PERINEURAL AS NEEDED
Status: DISCONTINUED | OUTPATIENT
Start: 2018-08-28 | End: 2018-08-28 | Stop reason: SURG

## 2018-08-28 RX ADMIN — SODIUM CHLORIDE, POTASSIUM CHLORIDE, SODIUM LACTATE AND CALCIUM CHLORIDE: 600; 310; 30; 20 INJECTION, SOLUTION INTRAVENOUS at 15:29

## 2018-08-28 RX ADMIN — PROPOFOL 100 MG: 10 INJECTION, EMULSION INTRAVENOUS at 15:33

## 2018-08-28 RX ADMIN — EPHEDRINE SULFATE 20 MG: 50 INJECTION INTRAMUSCULAR; INTRAVENOUS; SUBCUTANEOUS at 15:47

## 2018-08-28 RX ADMIN — LIDOCAINE HYDROCHLORIDE 30 MG: 20 INJECTION, SOLUTION INFILTRATION; PERINEURAL at 15:33

## 2018-08-28 RX ADMIN — EPHEDRINE SULFATE 20 MG: 50 INJECTION INTRAMUSCULAR; INTRAVENOUS; SUBCUTANEOUS at 15:42

## 2018-08-28 RX ADMIN — SODIUM CHLORIDE, POTASSIUM CHLORIDE, SODIUM LACTATE AND CALCIUM CHLORIDE 1000 ML: 600; 310; 30; 20 INJECTION, SOLUTION INTRAVENOUS at 14:48

## 2018-08-28 RX ADMIN — EPHEDRINE SULFATE 10 MG: 50 INJECTION INTRAMUSCULAR; INTRAVENOUS; SUBCUTANEOUS at 15:37

## 2018-08-28 RX ADMIN — EPHEDRINE SULFATE 20 MG: 50 INJECTION INTRAMUSCULAR; INTRAVENOUS; SUBCUTANEOUS at 15:52

## 2018-08-28 RX ADMIN — PROPOFOL 300 MCG/KG/MIN: 10 INJECTION, EMULSION INTRAVENOUS at 15:33

## 2018-08-28 NOTE — ANESTHESIA POSTPROCEDURE EVALUATION
Patient: Reba Banerjee    Procedure Summary     Date:  08/28/18 Room / Location:  [facility] ENDOSCOPY 10 / [facility] ENDOSCOPY    Anesthesia Start:  1529 Anesthesia Stop:  1559    Procedures:       ESOPHAGOGASTRODUODENOSCOPY with cold biopsies (N/A Esophagus)      COLONOSCOPY to cecum  and terminal ileum with cold biopsies (N/A ) Diagnosis:       Nausea      Abdominal bloating      Lower abdominal pain      Abnormal liver function tests      Abnormal finding on radiology exam      Pain of upper abdomen      Non-intractable vomiting with nausea, unspecified vomiting type      (Nausea [R11.0])      (Abdominal bloating [R14.0])      (Lower abdominal pain [R10.30])      (Abnormal liver function tests [R79.89])      (Abnormal finding on radiology exam [R93.8])      (Pain of upper abdomen [R10.10])      (Non-intractable vomiting with nausea, unspecified vomiting type [R11.2])    Surgeon:  Fabio Juan MD Provider:  David Mccullough MD    Anesthesia Type:  MAC ASA Status:  2          Anesthesia Type: MAC  Last vitals  BP       Temp   36.6 °C (97.9 °F) (08/28/18 1444)   Pulse   73 (08/28/18 1444)   Resp   14 (08/28/18 1444)     SpO2         Post Anesthesia Care and Evaluation    Patient location during evaluation: PHASE II  Patient participation: complete - patient participated  Level of consciousness: awake and alert  Pain management: adequate  Airway patency: patent  Anesthetic complications: No anesthetic complications  PONV Status: none  Cardiovascular status: acceptable  Respiratory status: acceptable  Hydration status: acceptable

## 2018-08-28 NOTE — TELEPHONE ENCOUNTER
----- Message from Fabio Juan MD sent at 8/28/2018  3:34 PM EDT -----  Regarding: labs  Please call Dr. Hairston office today I need labs for this patient for the last 24 months, specifically liver function tests, please scan them and send to me  thx  bd

## 2018-08-28 NOTE — TELEPHONE ENCOUNTER
Called Dr Hairston' office and requested labs. Medical records person asked that request be faxed to their office at [phone].   Request faxed.

## 2018-08-28 NOTE — ANESTHESIA PREPROCEDURE EVALUATION
Anesthesia Evaluation     Patient summary reviewed and Nursing notes reviewed                Airway   Mallampati: II  TM distance: >3 FB  Neck ROM: full  No difficulty expected  Dental - normal exam     Pulmonary - normal exam   (+) a smoker Current Abstained day of surgery,   Cardiovascular - normal exam    (+) valvular problems/murmurs, hyperlipidemia,       Neuro/Psych  (+) headaches, numbness,       ROS Comment: RSD/cervical radiculopathy  GI/Hepatic/Renal/Endo    (+)  GERD,      Musculoskeletal     (+) neck pain,   Abdominal  - normal exam   Substance History      OB/GYN          Other                      Anesthesia Plan    ASA 2     MAC     intravenous induction   Anesthetic plan and risks discussed with patient.

## 2018-08-29 ENCOUNTER — TELEPHONE (OUTPATIENT)
Dept: GASTROENTEROLOGY | Facility: CLINIC | Age: 53
End: 2018-08-29

## 2018-08-29 NOTE — TELEPHONE ENCOUNTER
----- Message from Good Fuentes sent at 8/29/2018  8:39 AM EDT -----  Regarding: Pt called   Contact: [phone]  PT IS CALLING STATING IS HAVING SOME SIDE EFFECTS AFTER HER SCOPES YESTERDAY & SHE HAD TO MISS WORK TODAY. PT IS ASKING FOR A CALL BACK.

## 2018-08-29 NOTE — TELEPHONE ENCOUNTER
Returned patient's phone call, she states she ate a salad after her scopes.  She states she has had multiple loose stools since, some with small clots but since she started passing the salad no more clots. She states she is having more intensified pain in her right upper quadrant. She did have some nausea along with it but has taken Phenergan which eased the nausea.  She states she needs a MD note to release her to go back to work. Advised letter will be written, placed at the  and she may pick it up at her convenience. Advised will send an update to Dr. Juan regarding her symptoms. She verb understanding and is agreeable to the plan.

## 2018-08-30 ENCOUNTER — PREP FOR SURGERY (OUTPATIENT)
Dept: OTHER | Facility: HOSPITAL | Age: 53
End: 2018-08-30

## 2018-08-30 DIAGNOSIS — R93.89 ABNORMAL FINDING ON IMAGING: Primary | ICD-10-CM

## 2018-08-30 LAB
CYTO UR: NORMAL
LAB AP CASE REPORT: NORMAL
PATH REPORT.FINAL DX SPEC: NORMAL
PATH REPORT.GROSS SPEC: NORMAL

## 2018-08-30 NOTE — TELEPHONE ENCOUNTER
"Per Dr Juan: \"Nelly:      will you talk to Kit about this patient when he has a break today may be for 3 or 4 minutes at lunch     Kit a very nice 52-year-old female who has had her gallbladder removed but his been plagued with right upper quadrant pain over the last year.  Periodic liver tests show a mildly elevated alkaline phosphatase occasionally, but normal transaminases   MRCP at [facility] shows dilated intra-and extrahepatic ducts possible stricture, I don't have the images just the report and they are in the chart.     I did an unremarkable EGD and colonoscopy this week     Can we get her scheduled for ERCP?\"      "

## 2018-08-30 NOTE — TELEPHONE ENCOUNTER
"Per Dr Juan: \"Yes of course can wait but let her  her no I didn't know he was on vacation, she expected an answer today   Let her know  we'll call her Tuesday   Please call her and let her know that today\"    "

## 2018-08-30 NOTE — TELEPHONE ENCOUNTER
Notes recorded by Fabio Juan MD on 8/23/2018 at 4:41 PM EDT  Labs normal    Notes recorded by Fabio Juan MD on 8/30/2018 at 11:22 AM EDT  Colonoscopy recall 5 years  Please ask Dr. Castellon today if she can have ERCP next week  Case request is in

## 2018-09-04 ENCOUNTER — TELEPHONE (OUTPATIENT)
Dept: GASTROENTEROLOGY | Facility: CLINIC | Age: 53
End: 2018-09-04

## 2018-09-04 NOTE — TELEPHONE ENCOUNTER
Medical records at [facility] called. Spoke with Brendon, she states she will have a carrier bring the disc to our office sometime this afternoon. When we have an answer, please call spouse([phone]). Patient is working Wed-Sat 12 hour shifts and is not available.   Patient states she continues with the nausea, pain and diarrhea. Advised will send an update to Dr. Juan. She verb understanding.

## 2018-09-04 NOTE — TELEPHONE ENCOUNTER
----- Message from Fabio Juan MD sent at 9/4/2018 10:23 AM EDT -----  Regarding: MRCP from [facility]  Please call [facility] and tell them we need disc with her  MRCP, have them FedEx that to us    Please call the patient and let her know that we are actually going to review images,she will not have her ERCP this week      ----- Message -----  From: Devin Castellon MD  Sent: 9/4/2018   7:51 AM  To: Fabio Juan MD    Review of labs show only one isolated alkaline phosphatase elevation.  MR with distal duct at 7 mm which is normal postcholecystectomy and pancreatic ducts which is on the high end of normal but still normal.  At best this seems to be sphincter of Oddi dysfunction type III and unlikely to benefit from ERCP.  Any questions I'm around today

## 2018-09-06 NOTE — TELEPHONE ENCOUNTER
"Per Dr Juan: \"I'm sure the disc was delivered yesterday please find it thank you   Place on my desk thank you\"    Per Dr Castellon's request, disc is on his desk for review.   "

## 2018-09-10 ENCOUNTER — TELEPHONE (OUTPATIENT)
Dept: GASTROENTEROLOGY | Facility: CLINIC | Age: 53
End: 2018-09-10

## 2018-09-10 NOTE — TELEPHONE ENCOUNTER
----- Message from Katherine Caballero sent at 9/10/2018 11:07 AM EDT -----  Regarding: PT CALLED - PROCEDURE  Contact: [phone]  PT CHECKING TO SEE IF DR LEMONS WANT HER TO HAVE ERCP? SHE STATED WE WERE TO GET BACK TO HER ON Friday. PLEASE CHECK. PT OFF ON Monday & Tuesday ONLY. CAN REACH HER

## 2018-09-11 NOTE — TELEPHONE ENCOUNTER
MRCP normal, no indication for ERCP at this time.  Patient to follow-up clinically consider future and ampullary manometry if biliary disease still consideration.

## 2018-09-13 RX ORDER — DESIPRAMINE HYDROCHLORIDE 10 MG/1
10 TABLET ORAL 2 TIMES DAILY
Qty: 60 TABLET | Refills: 1 | Status: SHIPPED | OUTPATIENT
Start: 2018-09-13 | End: 2018-10-22

## 2018-09-13 NOTE — TELEPHONE ENCOUNTER
"Per Dr Juan: \"Ladies please: Desipramine 10 mg by mouth twice a day #60, 1 rf for functional dyspepsia, epigastric pain syndrome, office visit nurse practitioner next available\"    Called pt's  and advised of the notes from Dr Castellon and Dr Juan. Advised will prescribe the medication to pt's pharmacy. Pts  verb understanding. Appt made for 10/22 at 9 AM.   "

## 2018-10-22 ENCOUNTER — OFFICE VISIT (OUTPATIENT)
Dept: GASTROENTEROLOGY | Facility: CLINIC | Age: 53
End: 2018-10-22

## 2018-10-22 VITALS
BODY MASS INDEX: 21.97 KG/M2 | TEMPERATURE: 98 F | DIASTOLIC BLOOD PRESSURE: 60 MMHG | WEIGHT: 140 LBS | SYSTOLIC BLOOD PRESSURE: 100 MMHG | HEIGHT: 67 IN

## 2018-10-22 DIAGNOSIS — R11.2 NON-INTRACTABLE VOMITING WITH NAUSEA, UNSPECIFIED VOMITING TYPE: ICD-10-CM

## 2018-10-22 DIAGNOSIS — R10.11 RIGHT UPPER QUADRANT ABDOMINAL PAIN: Primary | ICD-10-CM

## 2018-10-22 DIAGNOSIS — R93.89 ABNORMAL FINDING ON RADIOLOGY EXAM: ICD-10-CM

## 2018-10-22 DIAGNOSIS — R19.7 DIARRHEA, UNSPECIFIED TYPE: ICD-10-CM

## 2018-10-22 PROCEDURE — 99214 OFFICE O/P EST MOD 30 MIN: CPT | Performed by: NURSE PRACTITIONER

## 2018-10-22 RX ORDER — MONTELUKAST SODIUM 4 MG/1
TABLET, CHEWABLE ORAL
Qty: 60 TABLET | Refills: 11 | Status: SHIPPED | OUTPATIENT
Start: 2018-10-22 | End: 2021-10-19
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
## 2018-11-07 ENCOUNTER — TELEPHONE (OUTPATIENT)
Dept: GASTROENTEROLOGY | Facility: CLINIC | Age: 53
End: 2018-11-07

## 2018-11-07 NOTE — TELEPHONE ENCOUNTER
Called pt back; no answer. Left a message that I received a message from the phone  that she called with an update today. Advised the message said that she is doing well on the colestipol. Advised will make a note in the chart that she is doing well on the medication. Asked that she call back if she had anything further to add.

## 2018-11-07 NOTE — TELEPHONE ENCOUNTER
----- Message from Buzz Sarmiento sent at 11/7/2018 10:58 AM EST -----  Regarding: colestipol   Contact: [phone]  Medication is working for pt

## 2018-11-12 RX ORDER — DESIPRAMINE HYDROCHLORIDE 10 MG/1
TABLET ORAL
Qty: 60 TABLET | Refills: 1 | OUTPATIENT
Start: 2018-11-12

## 2021-10-11 NOTE — PROGRESS NOTES
Subjective   History of Present Illness: Reba Banerjee is a 56 y.o. female is being seen for consultation today at the request of Katty Hairston, *  For intermittent neck pain that radiates into bilateral arms with numbness, tingling and weakness in her bilateral hands.  New onset event occurred it just came on about 3 years after her neck surgery she reports mid- upper back pain. She has headaches everyday but has documented migraines that seem to be aggravated by her neck pain. She reports C-CARLY's with Dr. Fregoso in July of 2021 without relief.     She had a C5/6 disc arthroplasty on December 27, 2017 by Dr. Holloway and she has a sense that similar symptoms of neck and both arm pain were the indication.  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    While in the room and during my examination of the patient I wore a mask and eye protection.  I washed my hands before and after this patient encounter.  The patient was also wearing a mask.    Neck Pain   The problem occurs intermittently. The problem has been gradually worsening. The pain is present in the left side, midline and right side. The quality of the pain is described as aching, burning, cramping, shooting and stabbing. The symptoms are aggravated by position. Associated symptoms include headaches, numbness, tingling and weakness. Pertinent negatives include no pain with swallowing. She has tried muscle relaxants, oral narcotics, ice, NSAIDs and heat (injection therapy) for the symptoms.       The following portions of the patient's history were reviewed and updated as appropriate: allergies, current medications, past family history, past medical history, past social history, past surgical history and problem list.    Review of Systems   Constitutional: Positive for activity change.   Musculoskeletal:  Positive for neck pain and neck stiffness.   Neurological: Positive for tingling, weakness, numbness and headaches.   Psychiatric/Behavioral: Positive for sleep disturbance.       Objective     Vitals:    10/19/21 1331   BP: 114/80   Pulse: 88   Temp: 98.7 °F (37.1 °C)   SpO2: 99%   Weight: 61.2 kg (135 lb)     Body mass index is 21.14 kg/m².      Physical Exam  Neurologic Exam    Physical Exam:    CONSTITUTIONAL: This 56 year old   female appears well developed, well-nourished and in no acute distress.    HEAD & FACE: the head and face are symmetric, normocephalic and atraumatic.    EYES: Inspection of the conjunctivae and lids reveals no swelling, erythema or discharge.  Pupils are round, equal and reactive to light and there is no scleral icterus.    EARS, NOSE, MOUTH & THROAT: On inspection, the ears and nose are within normal limits.    NECK: the neck is supple and symmetric. The trachea is midline with no masses.    PULMONARY: Respiratory effort is normal with no increased work of breathing or signs of respiratory distress.    CARDIOVASCULAR: Pedal pulses are +2/4 bilaterally. Examination of the extremities shows no edema or varicosities.    LYMPHATIC: There is no palpable lymphadenopathy of the neck.    MUSCULOSKELETAL: Gait and station are within normal limits. The spine has no tenderness palpation but she has pain upon range of motion of the neck    SKIN: The skin is warm, dry and intact, anterior skin incision has healed completely.    NEUROLOGIC:   Cranial Nerves 2-12 intact  Normal motor strength noted. Muscle bulk and tone are normal.  Sensory exam is normal to fine touch to confrontational testing bilaterally  Reflexes on the right side demonstrates 1/4 Triceps Reflex, 1/4 Biceps Reflex, 0/4 Brachioradialis Reflex, 1/4 Knee Jerk Reflex, 0/4 Ankle Jerk Reflex and no ankle clonus on the right.   Reflexes on the left side demonstrates 1/4 Triceps Reflex, 1/4 Biceps Reflex, 0/4 Brachioradialis  Reflex, 1/4 Knee Jerk Reflex, 0/4 Ankle Jerk Reflex and no ankle clonus on the left.  Superficial/Primitive Reflexes: primitive reflexes were absent.  Ferguson's, Babinski, and Clonus signs all negative.  No coordination deficit observed.  Radicular testing showed a negative Spurling's maneuver but she has neck pain with it  Cortical function is intact and without deficits. Speech is normal.    PSYCHIATRIC: oriented to person, place and time. Patient's mood and affect are normal.    Assessment/Plan   Independent Review of Radiographic Studies:      I personally reviewed the images from the following studies.    MRI of the cervical spine done on April 30, 2021 at [facility] reveals postsurgical changes compatible with cervical interbody device at C5-C6.  At C6-C7 there is left posterior lateral disc bulging and spondylosis creating moderate left greater than mild right foraminal narrowing.    Medical Decision Making:      The official MRI report suggest that there was a cervical fusion C5-C6 but is well documented that is a cervical disc arthroplasty.  I have never placed a cervical disc arthroplasty and am not familiar with how to evaluate the integrity or success of such a device.  She does have some degenerative change at the level below but no central canal or significant neuroforaminal narrowing in correlation with her examination to raise a concern.  Therefore I think we need to get her in the hands of someone who is well versed in the nuances of cervical disc arthroplasty.  We will have her see one of the [facility] neurosurgical group spine subspecialists for evaluation.    No follow-ups on file.    Diagnoses and all orders for this visit:    1. Hx of neck surgery (Primary)  -     Ambulatory Referral to Neurosurgery    2. Posterior neck pain  -     Ambulatory Referral to Neurosurgery             Esvin Cerrato MD FACS [facility]  Neurological Surgery

## 2021-10-19 ENCOUNTER — OFFICE VISIT (OUTPATIENT)
Dept: NEUROSURGERY | Facility: CLINIC | Age: 56
End: 2021-10-19

## 2021-10-19 VITALS
BODY MASS INDEX: 21.14 KG/M2 | HEART RATE: 88 BPM | WEIGHT: 135 LBS | OXYGEN SATURATION: 99 % | SYSTOLIC BLOOD PRESSURE: 114 MMHG | TEMPERATURE: 98.7 F | DIASTOLIC BLOOD PRESSURE: 80 MMHG

## 2021-10-19 DIAGNOSIS — Z98.890 HX OF NECK SURGERY: Primary | ICD-10-CM

## 2021-10-19 DIAGNOSIS — M54.2 POSTERIOR NECK PAIN: ICD-10-CM

## 2021-10-19 PROCEDURE — 99203 OFFICE O/P NEW LOW 30 MIN: CPT | Performed by: NEUROLOGICAL SURGERY

## 2021-10-19 RX ORDER — CHOLECALCIFEROL (VITAMIN D3) 125 MCG
10 CAPSULE ORAL
COMMUNITY

## 2021-10-19 RX ORDER — FLUTICASONE PROPIONATE 50 MCG
2 SPRAY, SUSPENSION (ML) NASAL DAILY
COMMUNITY

## 2021-10-19 RX ORDER — TRAMADOL HYDROCHLORIDE 50 MG/1
TABLET ORAL
COMMUNITY
Start: 2021-07-29

## 2021-10-20 ENCOUNTER — TELEPHONE (OUTPATIENT)
Dept: NEUROSURGERY | Facility: CLINIC | Age: 56
End: 2021-10-20

## 2023-02-12 RX ORDER — BACLOFEN 20 MG/1
TABLET ORAL
Qty: 90 TABLET | Refills: 1 | Status: SHIPPED | OUTPATIENT
Start: 2023-02-12 | End: 2023-03-19

## 2023-03-19 RX ORDER — BACLOFEN 20 MG/1
TABLET ORAL
Qty: 90 TABLET | Refills: 0 | Status: SHIPPED | OUTPATIENT
Start: 2023-03-19

## 2023-04-16 RX ORDER — BACLOFEN 20 MG/1
TABLET ORAL
Qty: 90 TABLET | Refills: 0 | Status: SHIPPED | OUTPATIENT
Start: 2023-04-16 | End: 2023-06-07 | Stop reason: SDUPTHER

## 2023-06-07 RX ORDER — BACLOFEN 20 MG/1
20 TABLET ORAL 3 TIMES DAILY
Qty: 30 TABLET | Refills: 0 | Status: SHIPPED | OUTPATIENT
Start: 2023-06-07

## 2023-06-07 NOTE — TELEPHONE ENCOUNTER
Rx Refill Note  Requested Prescriptions     Pending Prescriptions Disp Refills    baclofen (LIORESAL) 20 MG tablet       Sig: Take 1 tablet by mouth 3 (Three) Times a Day.      Last office visit with prescribing clinician: 11/21/2022   Next office visit with prescribing clinician: Visit date not found   Last Labs Done: 11/21/2022 (Information found in [facility])     Matt Goode CMA  06/07/23, 14:55 EDT

## 2023-06-18 RX ORDER — TRAZODONE HYDROCHLORIDE 100 MG/1
TABLET ORAL
Qty: 90 TABLET | Refills: 1 | Status: SHIPPED | OUTPATIENT
Start: 2023-06-18

## 2024-01-10 ENCOUNTER — TRANSCRIBE ORDERS (OUTPATIENT)
Dept: ADMINISTRATIVE | Facility: HOSPITAL | Age: 59
End: 2024-01-10
Payer: COMMERCIAL

## 2024-01-10 ENCOUNTER — HOSPITAL ENCOUNTER (OUTPATIENT)
Dept: GENERAL RADIOLOGY | Facility: HOSPITAL | Age: 59
Discharge: HOME | End: 2024-01-10
Payer: OTHER MISCELLANEOUS
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
## 2024-01-10 DIAGNOSIS — M79.642 LEFT HAND PAIN: ICD-10-CM

## 2024-01-10 DIAGNOSIS — M79.642 LEFT HAND PAIN: Primary | ICD-10-CM

## 2024-01-10 DIAGNOSIS — M25.532 LEFT WRIST PAIN: ICD-10-CM

## 2024-01-10 PROCEDURE — 73110 X-RAY EXAM OF WRIST: CPT

## 2024-01-10 PROCEDURE — 73130 X-RAY EXAM OF HAND: CPT

## 2024-01-22 ENCOUNTER — TRANSCRIBE ORDERS (OUTPATIENT)
Dept: ADMINISTRATIVE | Facility: HOSPITAL | Age: 59
End: 2024-01-22
Payer: COMMERCIAL

## 2024-01-22 DIAGNOSIS — M25.532 LEFT WRIST PAIN: ICD-10-CM

## 2024-01-22 DIAGNOSIS — M79.642 PAIN OF LEFT HAND: Primary | ICD-10-CM

## 2024-01-31 ENCOUNTER — HOSPITAL ENCOUNTER (OUTPATIENT)
Dept: MRI IMAGING | Facility: HOSPITAL | Age: 59
Discharge: HOME | End: 2024-01-31
Admitting: NURSE PRACTITIONER
Payer: OTHER MISCELLANEOUS

## 2024-01-31 DIAGNOSIS — M79.642 PAIN OF LEFT HAND: ICD-10-CM

## 2024-01-31 DIAGNOSIS — M25.532 LEFT WRIST PAIN: ICD-10-CM

## 2024-01-31 PROCEDURE — 73221 MRI JOINT UPR EXTREM W/O DYE: CPT

## 2025-07-21 ENCOUNTER — OFFICE VISIT (OUTPATIENT)
Dept: FAMILY MEDICINE CLINIC | Facility: CLINIC | Age: 60
End: 2025-07-21
Payer: COMMERCIAL

## 2025-07-21 VITALS
WEIGHT: 140 LBS | OXYGEN SATURATION: 99 % | HEART RATE: 75 BPM | DIASTOLIC BLOOD PRESSURE: 62 MMHG | SYSTOLIC BLOOD PRESSURE: 110 MMHG | HEIGHT: 67 IN | TEMPERATURE: 98.1 F | BODY MASS INDEX: 21.97 KG/M2

## 2025-07-21 DIAGNOSIS — R00.2 PALPITATIONS: ICD-10-CM

## 2025-07-21 DIAGNOSIS — I34.1 MVP (MITRAL VALVE PROLAPSE): ICD-10-CM

## 2025-07-21 DIAGNOSIS — M54.2 ANTERIOR NECK PAIN: ICD-10-CM

## 2025-07-21 DIAGNOSIS — M54.2 POSTERIOR NECK PAIN: ICD-10-CM

## 2025-07-21 DIAGNOSIS — Z12.11 ENCOUNTER FOR SCREENING COLONOSCOPY: Primary | ICD-10-CM

## 2025-07-21 DIAGNOSIS — M62.81 MUSCLE WEAKNESS: ICD-10-CM

## 2025-07-21 PROBLEM — R21 RASH: Status: ACTIVE | Noted: 2025-02-20

## 2025-07-21 PROBLEM — M18.12 OSTEOARTHRITIS OF CARPOMETACARPAL (CMC) JOINT OF LEFT THUMB: Status: ACTIVE | Noted: 2024-12-20

## 2025-07-21 PROBLEM — F32.A ANXIETY AND DEPRESSION: Status: ACTIVE | Noted: 2024-07-11

## 2025-07-21 PROBLEM — M72.2 PLANTAR FASCIITIS, BILATERAL: Status: ACTIVE | Noted: 2024-12-20

## 2025-07-21 PROBLEM — F41.9 ANXIETY AND DEPRESSION: Status: ACTIVE | Noted: 2024-07-11

## 2025-07-21 PROBLEM — G43.719 INTRACTABLE CHRONIC MIGRAINE WITHOUT AURA AND WITHOUT STATUS MIGRAINOSUS: Status: ACTIVE | Noted: 2017-10-24

## 2025-07-21 PROBLEM — L60.1 ONYCHOLYSIS: Status: ACTIVE | Noted: 2024-12-20

## 2025-07-21 PROCEDURE — 99214 OFFICE O/P EST MOD 30 MIN: CPT | Performed by: FAMILY MEDICINE

## 2025-07-21 PROCEDURE — 99386 PREV VISIT NEW AGE 40-64: CPT | Performed by: FAMILY MEDICINE

## 2025-07-21 RX ORDER — DIPHENOXYLATE HYDROCHLORIDE AND ATROPINE SULFATE 2.5; .025 MG/1; MG/1
1 TABLET ORAL DAILY
COMMUNITY

## 2025-07-21 RX ORDER — CELECOXIB 200 MG/1
200 CAPSULE ORAL 2 TIMES DAILY
Qty: 180 CAPSULE | Refills: 0 | Status: SHIPPED | OUTPATIENT
Start: 2025-07-21

## 2025-07-21 RX ORDER — MELOXICAM 15 MG/1
TABLET ORAL
COMMUNITY
End: 2025-07-21

## 2025-07-21 RX ORDER — EPINEPHRINE 0.15 MG/.15ML
0.3 INJECTION SUBCUTANEOUS ONCE
Qty: 2 EACH | Refills: 0 | Status: SHIPPED | OUTPATIENT
Start: 2025-07-21 | End: 2025-07-21

## 2025-07-21 RX ORDER — BACLOFEN 10 MG/1
10 TABLET ORAL NIGHTLY PRN
Qty: 30 TABLET | Refills: 0 | Status: SHIPPED | OUTPATIENT
Start: 2025-07-21

## 2025-07-21 RX ORDER — FLUOXETINE 10 MG/1
CAPSULE ORAL
Qty: 21 CAPSULE | Refills: 0 | Status: SHIPPED | OUTPATIENT
Start: 2025-07-21 | End: 2025-08-18

## 2025-07-21 RX ORDER — DESOXIMETASONE 2.5 MG/G
1 OINTMENT TOPICAL 2 TIMES DAILY
Qty: 60 G | Refills: 0 | Status: SHIPPED | OUTPATIENT
Start: 2025-07-21 | End: 2025-08-04

## 2025-07-21 RX ORDER — CHOLECALCIFEROL (VITAMIN D3) 25 MCG
TABLET ORAL
COMMUNITY

## 2025-07-21 NOTE — PROGRESS NOTES
Chief Complaint  Establish Care    Yas Banerjee presents to [facility] PRIMARY CARE       The patient is a 59-year-old female who presents to the clinic to [facility].    She has been experiencing increased palpitations recently, described as a sensation of her heart skipping a beat, often causing her to catch her breath. These episodes occur during physical activity, such as gardening, and are not associated with chest tightness or shortness of breath. She has a history of mitral valve prolapse and heart palpitations, for which she was previously on atenolol. She has seen a cardiologist in the past and has undergone a stress test, Holter monitor, and echocardiogram. She is not currently on any medication for these conditions.    She also reports a sensation of neck tightness, which she describes as feeling like a spasm between the tendon and soft tissue. This sensation is not associated with her heartbeat and can occur spontaneously, even during rest. The pain is severe enough to cause soreness for several days after an episode. She first noticed this symptom within the last year.    She has been managing her complex regional pain syndrome since 2007 with various medications but has recently stopped all medications. She experiences good and bad days but generally feels better than when she was first diagnosed. She has an appointment with a dermatologist due to small sores on her body, which she believes are caused by scratching. She has tried hydroxyzine for itching without success and has also tried treating for scabies. The itching has been ongoing for years, but the sores only started appearing in October 2024. She has tried permethrin, homemade ointments, and triple antibiotic cream without relief. She has made several changes to her personal care products and laundry detergent in an attempt to alleviate the symptoms.    She takes Excedrin Migraine as needed,  approximately once a week, for migraines that she has been experiencing since her teenage years. She can not take Tylenol due to liver issues. She has tried prescription migraine medications in the past, including rizatriptan, Botox injections, and Topamax, but has not refilled these prescriptions recently. Her migraines are characterized by severe pain on one side of her head, often accompanied by blurred vision and nausea. She has had an MRI of her head in the past and was previously under the care of a neurologist who administered Botox injections. She has not been diagnosed with occipital neuralgia and has not received numbing injections for the occipital nerve. She has not noticed any significant changes in her migraines over the past 5 to 6 years. She finds relief from herbal teas and home remedies.    She is currently taking fluoxetine 20 mg daily, prescribed by her previous doctor following her mother's death last year. She wishes to wean herself off this medication. She reports poor sleep quality and takes melatonin 10 mg as needed after several days of poor sleep. She also takes magnesium daily, along with a multivitamin and probiotic. She does not report any feelings of hopelessness or suicidal ideation. She has days when she lacks motivation and prefers to rest and watch TV but feels that these episodes are becoming less frequent.    She is not currently taking any medication for allergies and has discontinued muscle relaxers. She has been experiencing muscle tightness recently and would like a prescription for muscle relaxers.    She takes meloxicam 15 mg daily and is considering increasing the dose due to persistent hip and hand pain. She has not tried any other long-acting anti-inflammatories like Celebrex.    She experiences difficulty walking upon waking up in the morning due to severe pain in her feet and legs. She also reports occasional leg weakness at work, which she describes as an ache rather  "than pain. This symptom is not associated with her knees or joints and improves with continued walking. She has been experiencing hip pain, which she attributes to weather changes and aging. She has undergone physical therapy for her hip pain and has changed her footwear, which seems to have helped. She wears compression socks, which she finds beneficial, and has noticed that her legs feel weaker when she does not wear them. She has a varicose vein on her leg that occasionally becomes enlarged. She has a history of a blood clot in her arm.    She quit smoking in 2024 after smoking half a pack a day for 20 to 25 years. She does not vape or use e-cigarettes. She does not drink alcohol or use recreational drugs.    She had a mammogram last year and is due for another one next year. She is not due for a colonoscopy until next year. She had polyps removed during her last colonoscopy in 2018. She sees an eye doctor annually and had her last visit last month. She also sees a dentist annually and had her last visit within the past year. She brushes her teeth three times a day and flosses regularly.    Supplemental Information  She has a history of whiplash from her teenage years and has undergone neck surgery.    SOCIAL HISTORY  The patient quit smoking in 2024 after smoking for 20-25 years at half a pack a day. The patient does not vape, drink alcohol, or use recreational drugs.    FAMILY HISTORY  The patient's father  of heart disease.    ALLERGIES  The patient is allergic to NUTS, PEANUTS, ALMONDS, SULFA DRUGS, PENICILLINS, and LATEX.    MEDICATIONS  Current: Excedrin migraine, vitamin D3 supplement, fluoxetine, melatonin, magnesium, multivitamin, probiotic, meloxicam.  Discontinued: Topamax, atenolol.    History of Present Illness    Objective   Vital Signs:  /62   Pulse 75   Temp 98.1 °F (36.7 °C)   Ht 170.2 cm (67.01\")   Wt 63.5 kg (140 lb)   SpO2 99%   BMI 21.92 kg/m²   Estimated body " "mass index is 21.92 kg/m² as calculated from the following:    Height as of this encounter: 170.2 cm (67.01\").    Weight as of this encounter: 63.5 kg (140 lb).    BMI is within normal parameters. No other follow-up for BMI required.      Physical Exam  Constitutional:       Appearance: Normal appearance.   HENT:      Head: Normocephalic and atraumatic.      Nose: Nose normal.      Mouth/Throat:      Mouth: Mucous membranes are moist.   Eyes:      General: Lids are normal.      Conjunctiva/sclera: Conjunctivae normal.   Cardiovascular:      Rate and Rhythm: Normal rate and regular rhythm.      Heart sounds: Normal heart sounds.   Pulmonary:      Effort: Pulmonary effort is normal.      Breath sounds: Normal breath sounds.   Musculoskeletal:      Cervical back: Normal range of motion.   Skin:     General: Skin is warm and dry.      Findings: Lesion present.   Neurological:      General: No focal deficit present.      Mental Status: She is alert and oriented to person, place, and time.      Motor: No weakness.      Gait: Gait is intact. Gait normal.      Deep Tendon Reflexes: Reflexes normal.   Psychiatric:         Mood and Affect: Mood normal.         Behavior: Behavior normal.         Thought Content: Thought content normal.        Result Review :               Results  Laboratory Studies  Cholesterol slightly elevated at 235, HDL at 80, triglycerides at 115, LDL at 132. TSH was 1.16. A1c was 5.5. Liver function was normal. Kidney function was normal. Glucose was normal. Blood count was normal, no anemia. White blood cell count was normal. Platelet count was normal.       Assessment and Plan        1. Establishment of care.  Her blood pressure is well-regulated at 110/62 today, and she maintains a healthy weight. Her last lab results from February 2025 indicate slightly elevated cholesterol levels, with a total cholesterol of 235, HDL of 80, triglycerides at 115, and LDL at 132. Her diabetes screen was normal, with " an A1c of 5.5. Thyroid function was satisfactory with a TSH of 1.16. Liver and kidney functions were within normal limits. Blood count showed no anemia, with normal white blood cell and platelet counts. She is due for a colonoscopy and had a mammogram on 07/19/2024. She wishes to proceed with mammograms every other year as opposed to yearly. A referral for a colonoscopy will be made.    2. Allergies.  She has severe allergies to NUTS, PEANUTS, ALMONDS, SULFA DRUGS, PENICILLINS, and LATEX. An EpiPen prescription will be sent to [facility].    3. Migraines.  She uses Excedrin Migraine as needed, approximately once a week. No prescription migraine medications are currently being used.    4. Mitral Valve Prolapse.  She reports increased palpitations. A referral to a cardiologist will be made for further evaluation and management.    5. Complex Regional Pain Syndrome.  She has been managing this condition since 2007 with varying success. A B12 level check will be conducted.    6. Muscle Spasms.  A prescription for muscle relaxers will be provided for nightly use as needed.    7. Joint Pain.  She will switch from Mobic to Celebrex for joint aches and pains.    8. Morning Stiffness.  An autoimmune screen will be conducted to assess inflammatory markers and muscle enzyme release.    9. Insomnia.  She will continue using melatonin at night as needed. [facility] behavioral [facility] for insomnia through a [facility] ann is recommended.    10. Mood Disorder.  She will transition from fluoxetine 20 mg daily to 10 mg capsules daily for 2 weeks, then every other day for 2 weeks before discontinuing. If symptoms worsen during the taper she will inform the clinic. Denies SI.     11. Skin Sores.  A prescription for Topicort ointment will be provided for use on itchy spots for no more than 2 weeks each.    12. Health Maintenance.  She had a mammogram on 07/19/2024 and is due for another one next year. A referral to  for a colonoscopy will be  made.    Diagnoses and all orders for this visit:    1. Encounter for screening colonoscopy (Primary)  -     Ambulatory Referral For Screening Colonoscopy    2. Palpitations  -     Ambulatory Referral to Cardiology for Valvular Disease    3. MVP (mitral valve prolapse)  -     Ambulatory Referral to Cardiology for Valvular Disease    4. Anterior neck pain  -     US Head Neck Soft Tissue; Future    5. Muscle weakness  -     Vitamin B12  -     LUISA by IFA, Reflex 9-biomarkers profile  -     C-reactive protein  -     Sedimentation rate, automated  -     CK    Other orders  -     EPINEPHrine 0.15 MG/0.15ML solution auto-injector injection; Inject 0.3 mL into the appropriate muscle as directed by prescriber 1 (One) Time for 1 dose.  Dispense: 2 each; Refill: 0  -     FLUoxetine (PROzac) 10 MG capsule; Take 1 capsule by mouth Daily for 14 days, THEN 1 capsule Every Other Day for 14 days.  Dispense: 21 capsule; Refill: 0  -     baclofen (LIORESAL) 10 MG tablet; Take 1 tablet by mouth At Night As Needed for Muscle Spasms.  Dispense: 30 tablet; Refill: 0  -     celecoxib (CeleBREX) 200 MG capsule; Take 1 capsule by mouth 2 (Two) Times a Day.  Dispense: 180 capsule; Refill: 0  -     desoximetasone (TOPICORT) 0.25 % ointment; Apply 1 Application topically to the appropriate area as directed 2 (Two) Times a Day for 14 days.  Dispense: 60 g; Refill: 0             Follow Up   No follow-ups on file.  Patient was given instructions and counseling regarding her condition or for health maintenance advice. Please see specific information pulled into the AVS if appropriate.     Patient or patient representative verbalized consent for the use of Ambient Listening during the visit with  Irene Woodruff MD for chart documentation. 7/21/2025  17:28 EDT

## 2025-07-21 NOTE — PATIENT INSTRUCTIONS
Sleep/fatigue plan:  - CBT-I is the most effective treatment for trouble sleeping according to research. You can try it for free at your own pace using this free ann from the [facility]: https://[employer].va.[employer]/ann/cbt-i-#:~:text=CBT%2Di%20[facility]%20is%20based,[facility]%20[facility]%20[facility]%20[facility]%20[facility].  - Keep a consistent sleep schedule. Get up at the same time every day, even on weekends or during vacations.  - Set a bedtime that is early enough for you to get at least 7-8 hours of sleep.  - Don’t go to bed unless you are sleepy.  - If you don’t fall asleep after 20 minutes, get out of bed. Go do a quiet activity without a lot of light exposure. It is especially important to not get on electronics.  - Establish a relaxing bedtime routine.  - Use your bed only for sleep and sex.  - Make your bedroom quiet and relaxing. Keep the room at a comfortable, cool temperature.  - Limit exposure to bright light in the evenings.  - Turn off electronic devices at least 30 minutes before bedtime.  - Don’t eat a large meal before bedtime. If you are hungry at night, eat a light, healthy snack.  - Exercise regularly and maintain a healthy diet.  - Avoid consuming caffeine in the afternoon or evening.  - Avoid consuming alcohol before bedtime.  - Reduce your fluid intake before bedtime.  https://sleepeducation.org/healthy-sleep/healthy-sleep-habits/

## 2025-07-23 DIAGNOSIS — E53.8 LOW SERUM VITAMIN B12: Primary | ICD-10-CM

## 2025-07-23 LAB
ANA SER QL IF: NEGATIVE
CK SERPL-CCNC: 210 U/L (ref 20–180)
CRP SERPL-MCNC: <0.3 MG/DL (ref 0–0.5)
ERYTHROCYTE [SEDIMENTATION RATE] IN BLOOD BY WESTERGREN METHOD: 3 MM/HR (ref 0–30)
LABORATORY COMMENT REPORT: NORMAL
VIT B12 SERPL-MCNC: 424 PG/ML (ref 211–946)

## 2025-07-24 ENCOUNTER — PATIENT ROUNDING (BHMG ONLY) (OUTPATIENT)
Dept: FAMILY MEDICINE CLINIC | Facility: CLINIC | Age: 60
End: 2025-07-24
Payer: COMMERCIAL

## 2025-07-24 NOTE — PROGRESS NOTES
Hi Lavonne,     I would like to welcome you to our practice [facility]     hope your recent visit to our office went smoothly.     If you have questions or concerns you may reach us at  [phone].    Thank you,   Rand/ Practice manager

## 2025-08-08 ENCOUNTER — TELEPHONE (OUTPATIENT)
Dept: GASTROENTEROLOGY | Facility: CLINIC | Age: 60
End: 2025-08-08
Payer: COMMERCIAL

## 2025-08-08 ENCOUNTER — PREP FOR SURGERY (OUTPATIENT)
Dept: OTHER | Facility: HOSPITAL | Age: 60
End: 2025-08-08
Payer: COMMERCIAL

## 2025-08-08 DIAGNOSIS — Z12.11 ENCOUNTER FOR SCREENING FOR MALIGNANT NEOPLASM OF COLON: Primary | ICD-10-CM

## 2025-08-11 ENCOUNTER — TELEPHONE (OUTPATIENT)
Dept: GASTROENTEROLOGY | Facility: CLINIC | Age: 60
End: 2025-08-11
Payer: COMMERCIAL

## 2025-08-12 ENCOUNTER — TELEPHONE (OUTPATIENT)
Dept: GASTROENTEROLOGY | Facility: CLINIC | Age: 60
End: 2025-08-12
Payer: COMMERCIAL

## 2025-08-13 ENCOUNTER — TELEPHONE (OUTPATIENT)
Dept: GASTROENTEROLOGY | Facility: CLINIC | Age: 60
End: 2025-08-13
Payer: COMMERCIAL

## 2025-08-13 RX ORDER — BACLOFEN 10 MG/1
10 TABLET ORAL NIGHTLY PRN
Qty: 30 TABLET | Refills: 0 | Status: SHIPPED | OUTPATIENT
Start: 2025-08-13

## 2025-08-14 ENCOUNTER — PATIENT MESSAGE (OUTPATIENT)
Dept: FAMILY MEDICINE CLINIC | Facility: CLINIC | Age: 60
End: 2025-08-14
Payer: COMMERCIAL

## 2025-08-15 RX ORDER — FLUOXETINE 10 MG/1
CAPSULE ORAL
Qty: 21 CAPSULE | Refills: 0 | Status: SHIPPED | OUTPATIENT
Start: 2025-08-15 | End: 2025-09-12

## 2025-08-26 ENCOUNTER — OFFICE VISIT (OUTPATIENT)
Dept: FAMILY MEDICINE CLINIC | Facility: CLINIC | Age: 60
End: 2025-08-26
Payer: COMMERCIAL

## 2025-08-26 VITALS
HEART RATE: 63 BPM | DIASTOLIC BLOOD PRESSURE: 70 MMHG | SYSTOLIC BLOOD PRESSURE: 120 MMHG | BODY MASS INDEX: 21.77 KG/M2 | WEIGHT: 138.7 LBS | HEIGHT: 67 IN | OXYGEN SATURATION: 96 % | TEMPERATURE: 98.2 F

## 2025-08-26 DIAGNOSIS — U07.1 COVID-19 VIRUS INFECTION: Primary | ICD-10-CM

## 2025-08-26 DIAGNOSIS — R53.83 FATIGUE, UNSPECIFIED TYPE: ICD-10-CM

## 2025-08-26 DIAGNOSIS — R51.9 ACUTE NONINTRACTABLE HEADACHE, UNSPECIFIED HEADACHE TYPE: ICD-10-CM

## 2025-08-26 DIAGNOSIS — R10.13 EPIGASTRIC PAIN: ICD-10-CM

## 2025-08-26 PROCEDURE — 99214 OFFICE O/P EST MOD 30 MIN: CPT | Performed by: FAMILY MEDICINE

## 2025-08-26 RX ORDER — EPINEPHRINE 0.15 MG/.15ML
INJECTION SUBCUTANEOUS
COMMUNITY
Start: 2025-07-21

## 2025-08-26 RX ORDER — OMEPRAZOLE 40 MG/1
40 CAPSULE, DELAYED RELEASE ORAL DAILY
Qty: 30 CAPSULE | Refills: 1 | Status: SHIPPED | OUTPATIENT
Start: 2025-08-26

## 2025-08-26 RX ORDER — ONDANSETRON 8 MG/1
8 TABLET, FILM COATED ORAL EVERY 8 HOURS PRN
Qty: 20 TABLET | Refills: 0 | Status: SHIPPED | OUTPATIENT
Start: 2025-08-26

## (undated) DEVICE — DISPOSABLE 9450003 ELECTRO TWST PAIR 8CH

## (undated) DEVICE — GLV SURG SENSICARE ALOE LF PF SZ7.5 GRN

## (undated) DEVICE — CANNULA,ADULT,SOFT-TOUCH,7'TUBE,UC: Brand: PENDING

## (undated) DEVICE — SUT MNCRYL PLS ANTIB UD 4/0 PS2 18IN

## (undated) DEVICE — GLV SURG BIOGEL LTX PF 7 1/2

## (undated) DEVICE — SUT SILK 2/0 TIES 18IN A185H

## (undated) DEVICE — CODMAN® SURGICAL PATTIES 3/4" X 3/4" (1.91CM X 1.91CM): Brand: CODMAN®

## (undated) DEVICE — DRP C/ARM 41X74IN

## (undated) DEVICE — DRP SLUSH WARMR MACH 52X66IN OM-ORS-301

## (undated) DEVICE — ADHS SKIN DERMABOND

## (undated) DEVICE — 3.0MM PRECISION NEURO (MATCH HEAD)

## (undated) DEVICE — SINGLE-USE BIOPSY FORCEPS: Brand: RADIAL JAW 4

## (undated) DEVICE — SHEET, DRAPE, SPLIT, STERILE: Brand: MEDLINE

## (undated) DEVICE — PIN DISTRACT TI 14MM STRL

## (undated) DEVICE — APPL CHLORAPREP W/TINT 10.5ML PERC STRL

## (undated) DEVICE — PK NEURO SPINE 40

## (undated) DEVICE — DRP MICROSCP LEICA W/GLASS LENS

## (undated) DEVICE — GLV SURG SENSICARE MICRO PF LF 8 STRL

## (undated) DEVICE — ELECTRD BLD EDGE/INSUL1P 2.4X5.1MM STRL

## (undated) DEVICE — BIT 6972118 RAIL CUTTER 1.5MM DIAMOND

## (undated) DEVICE — ENCORE® LATEX ORTHO SIZE 8.5, STERILE LATEX POWDER-FREE SURGICAL GLOVE: Brand: ENCORE

## (undated) DEVICE — LIMB HOLDER, WRIST/ANKLE: Brand: DEROYAL

## (undated) DEVICE — SMOKE EVACUATION TUBING WITH 7/8 IN TO 1/4 IN REDUCER: Brand: BUFFALO FILTER

## (undated) DEVICE — ANTIBACTERIAL UNDYED BRAIDED (POLYGLACTIN 910), SYNTHETIC ABSORBABLE SUTURE: Brand: COATED VICRYL

## (undated) DEVICE — CONN TBG Y 5 IN 1 LF STRL

## (undated) DEVICE — Device

## (undated) DEVICE — BITEBLOCK OMNI BLOC

## (undated) DEVICE — THE TORRENT IRRIGATION SCOPE CONNECTOR IS USED WITH THE TORRENT IRRIGATION TUBING TO PROVIDE IRRIGATION FLUIDS SUCH AS STERILE WATER DURING GASTROINTESTINAL ENDOSCOPIC PROCEDURES WHEN USED IN CONJUNCTION WITH AN IRRIGATION PUMP (OR ELECTROSURGICAL UNIT).: Brand: TORRENT

## (undated) DEVICE — GOWN,PREVENTION PLUS,XXLARGE,STERILE: Brand: MEDLINE

## (undated) DEVICE — TUBING, SUCTION, 1/4" X 10', STRAIGHT: Brand: MEDLINE

## (undated) DEVICE — MAGNETIC DRAPE: Brand: DEVON

## (undated) DEVICE — Device: Brand: DEFENDO AIR/WATER/SUCTION AND BIOPSY VALVE

## (undated) DEVICE — ADHS SKIN DERMABOND TOP ADVANCED

## (undated) DEVICE — NDL SPINE 20G 3 1/2 YEL STRL 1P/U

## (undated) DEVICE — NDL HYPO PRECISIONGLIDE REG 25G 1 1/2

## (undated) DEVICE — DISPOSABLE IRRIGATION BIPOLAR CORD, M1000 TYPE: Brand: KIRWAN